# Patient Record
Sex: MALE | Race: WHITE | NOT HISPANIC OR LATINO | Employment: FULL TIME | URBAN - METROPOLITAN AREA
[De-identification: names, ages, dates, MRNs, and addresses within clinical notes are randomized per-mention and may not be internally consistent; named-entity substitution may affect disease eponyms.]

---
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
## 2019-03-03 NOTE — PROGRESS NOTES
FAMILY MEDICINE    Patient Active Problem List   Diagnosis    Attention deficit hyperactivity disorder (ADHD)       CC:   Chief Complaint   Patient presents with    Establish Care       HPI: Hiram Martinez is a 18 y.o. male  - with ADHD here to transfer care from NP Thu Gil. Pt is also here for follow-up of his ADHD and Vyvanse refill.     1. ADHD predominantly inattentive    Age diagnosed: middle school 5th grade  Diagnosed by: Dr. Gillespie (peditrician)   Initial evaluation present in chart: no but continued care by initially provider Dr. Gillespie in chart    Past medications: no other medication beside Vyvanse  Reasons for changing past medications: NA    Current medications: Vyvanse 30 mg daily  Concerns with medication: denies  AM medication: Vyvanse 30 mg daily at 6:30 AM  Lunch medication: NA  Afternoon medication: NA   Medication holiday: weekends and school holidays    Daily Activity: [employer] and season just ended 3/2019        HEALTH MAINTENANCE:   Health Maintenance   Topic Date Due    Lipid Panel  2000    TETANUS VACCINE  12/17/2017    Influenza Vaccine  08/01/2018    HPV Vaccines  Completed       ROS: Review of Systems   Constitutional: Negative for activity change, appetite change, fatigue and unexpected weight change.   Eyes: Negative for visual disturbance.   Respiratory: Negative for chest tightness and shortness of breath.    Cardiovascular: Negative for chest pain and palpitations.   Gastrointestinal: Negative for abdominal pain, constipation, diarrhea, nausea and vomiting.   Endocrine: Negative for cold intolerance.   Genitourinary: Negative for difficulty urinating and urgency.   Musculoskeletal: Negative for myalgias.   Skin: Negative for rash.   Neurological: Negative for dizziness, light-headedness and headaches.   Psychiatric/Behavioral: Negative for agitation, dysphoric mood and sleep disturbance. The patient is not nervous/anxious.        ALLERGIES:  "  Review of patient's allergies indicates:  No Known Allergies    MEDS:     Current Outpatient Medications:     lisdexamfetamine (VYVANSE) 30 MG capsule, Take 1 capsule (30 mg total) by mouth every morning., Disp: 90 capsule, Rfl: 0    Past Medical History:   Diagnosis Date    ADHD (attention deficit hyperactivity disorder)     Closed left fibular fracture 02/2018       History reviewed. No pertinent surgical history.    Family History   Problem Relation Age of Onset    Hypertension Father     Diabetes Father     Heart disease Father     No Known Problems Mother        Social History     Social History Narrative    Lives with both parents, Ирина and 1 brother, Mansoor. Goes to [employer] 12th grade. 1 dog, skipper. Wrestler. Denies alcohol, smoking or illicit drugs       OBJECTIVE:   Vitals:    03/04/19 1006   BP: 120/68   BP Location: Left arm   Patient Position: Sitting   BP Method: Medium (Automatic)   Pulse: (!) 53   Temp: 98.2 °F (36.8 °C)   TempSrc: Oral   SpO2: 100%   Weight: 66.9 kg (147 lb 8 oz)   Height: 5' 7" (1.702 m)     Body mass index is 23.1 kg/m².    Physical Exam   Constitutional: No distress.   Cardiovascular: Normal rate, regular rhythm, normal heart sounds and intact distal pulses.   Pulmonary/Chest: Effort normal and breath sounds normal.   Neurological: He is alert.   Psychiatric: He has a normal mood and affect. His speech is normal and behavior is normal.         Depression Patient Health Questionnaire 3/4/2019   In the last two weeks how often have you had little interest or pleasure in doing things 0   In the last two weeks how often have you felt down, depressed or hopeless 0   PHQ-2 Total Score 0     ASSESSMENT:  Problem List Items Addressed This Visit        Psychiatric    Attention deficit hyperactivity disorder (ADHD)    Relevant Medications    lisdexamfetamine (VYVANSE) 30 MG capsule          PLAN:   Orders Placed This Encounter    Tdap Vaccine    Influenza - " Quadrivalent (3 years & older) (PF)    lisdexamfetamine (VYVANSE) 30 MG capsule     Follow-up in 3 months    Dr. Olga Hui D.O.   Family Medicine

## 2019-03-04 ENCOUNTER — OFFICE VISIT (OUTPATIENT)
Dept: FAMILY MEDICINE | Facility: CLINIC | Age: 19
End: 2019-03-04
Payer: COMMERCIAL

## 2019-03-04 VITALS
WEIGHT: 147.5 LBS | BODY MASS INDEX: 23.15 KG/M2 | DIASTOLIC BLOOD PRESSURE: 68 MMHG | TEMPERATURE: 98 F | HEART RATE: 53 BPM | OXYGEN SATURATION: 100 % | SYSTOLIC BLOOD PRESSURE: 120 MMHG | HEIGHT: 67 IN

## 2019-03-04 DIAGNOSIS — F90.9 ATTENTION DEFICIT HYPERACTIVITY DISORDER (ADHD), UNSPECIFIED ADHD TYPE: ICD-10-CM

## 2019-03-04 PROCEDURE — 3008F PR BODY MASS INDEX (BMI) DOCUMENTED: ICD-10-PCS | Mod: CPTII,S$GLB,, | Performed by: FAMILY MEDICINE

## 2019-03-04 PROCEDURE — 90715 TDAP VACCINE GREATER THAN OR EQUAL TO 7YO IM: ICD-10-PCS | Mod: S$GLB,,, | Performed by: FAMILY MEDICINE

## 2019-03-04 PROCEDURE — 99213 PR OFFICE/OUTPT VISIT, EST, LEVL III, 20-29 MIN: ICD-10-PCS | Mod: 25,S$GLB,, | Performed by: FAMILY MEDICINE

## 2019-03-04 PROCEDURE — 90472 IMMUNIZATION ADMIN EACH ADD: CPT | Mod: S$GLB,,, | Performed by: FAMILY MEDICINE

## 2019-03-04 PROCEDURE — 90715 TDAP VACCINE 7 YRS/> IM: CPT | Mod: S$GLB,,, | Performed by: FAMILY MEDICINE

## 2019-03-04 PROCEDURE — 99999 PR PBB SHADOW E&M-EST. PATIENT-LVL III: ICD-10-PCS | Mod: PBBFAC,,, | Performed by: FAMILY MEDICINE

## 2019-03-04 PROCEDURE — 99999 PR PBB SHADOW E&M-EST. PATIENT-LVL III: CPT | Mod: PBBFAC,,, | Performed by: FAMILY MEDICINE

## 2019-03-04 PROCEDURE — 90471 FLU VACCINE (QUAD) GREATER THAN OR EQUAL TO 3YO PRESERVATIVE FREE IM: ICD-10-PCS | Mod: S$GLB,,, | Performed by: FAMILY MEDICINE

## 2019-03-04 PROCEDURE — 90471 IMMUNIZATION ADMIN: CPT | Mod: S$GLB,,, | Performed by: FAMILY MEDICINE

## 2019-03-04 PROCEDURE — 99213 OFFICE O/P EST LOW 20 MIN: CPT | Mod: 25,S$GLB,, | Performed by: FAMILY MEDICINE

## 2019-03-04 PROCEDURE — 90686 IIV4 VACC NO PRSV 0.5 ML IM: CPT | Mod: S$GLB,,, | Performed by: FAMILY MEDICINE

## 2019-03-04 PROCEDURE — 3008F BODY MASS INDEX DOCD: CPT | Mod: CPTII,S$GLB,, | Performed by: FAMILY MEDICINE

## 2019-03-04 PROCEDURE — 90472 TDAP VACCINE GREATER THAN OR EQUAL TO 7YO IM: ICD-10-PCS | Mod: S$GLB,,, | Performed by: FAMILY MEDICINE

## 2019-03-04 PROCEDURE — 90686 FLU VACCINE (QUAD) GREATER THAN OR EQUAL TO 3YO PRESERVATIVE FREE IM: ICD-10-PCS | Mod: S$GLB,,, | Performed by: FAMILY MEDICINE

## 2019-03-04 RX ORDER — LISDEXAMFETAMINE DIMESYLATE 30 MG/1
30 CAPSULE ORAL EVERY MORNING
Qty: 90 CAPSULE | Refills: 0 | Status: SHIPPED | OUTPATIENT
Start: 2019-03-04 | End: 2020-02-21 | Stop reason: SDUPTHER

## 2019-03-04 NOTE — LETTER
March 4, 2019      [place]  [address]  [address]  [facility] [address]           [address] [place]   [facility] [address]  Phone: [phone]  Fax: [phone]          Patient: Hiram Martinez   MR Number: [id number]   YOB: 2000   Date of Visit: 3/4/2019       Dear Thu Gil:    Thank you for referring Hiram Martinez to me for evaluation. Attached you will find relevant portions of my assessment and plan of care.    If you have questions, please do not hesitate to call me. I look forward to following Hiram Martinez along with you.    Sincerely,    Olga Hui,     Enclosure  CC:  No Recipients    If you would like to receive this communication electronically, please contact [email or web address] or [phone] to request more information on [employer] Link access.    For providers and/or their staff who would like to refer a patient to [facility], please contact us through our one-stop-shop provider referral line, [facility], at [phone].    If you feel you have received this communication in error or would no longer like to receive these types of communications, please e-mail [email or web address]

## 2019-08-08 ENCOUNTER — OFFICE VISIT (OUTPATIENT)
Dept: URGENT CARE | Facility: CLINIC | Age: 19
End: 2019-08-08
Payer: COMMERCIAL

## 2019-08-08 VITALS
HEIGHT: 67 IN | SYSTOLIC BLOOD PRESSURE: 157 MMHG | HEART RATE: 63 BPM | TEMPERATURE: 98 F | BODY MASS INDEX: 25.11 KG/M2 | WEIGHT: 160 LBS | RESPIRATION RATE: 18 BRPM | OXYGEN SATURATION: 100 % | DIASTOLIC BLOOD PRESSURE: 70 MMHG

## 2019-08-08 DIAGNOSIS — R03.0 ELEVATED BLOOD PRESSURE READING: ICD-10-CM

## 2019-08-08 DIAGNOSIS — L25.9 CONTACT DERMATITIS, UNSPECIFIED CONTACT DERMATITIS TYPE, UNSPECIFIED TRIGGER: Primary | ICD-10-CM

## 2019-08-08 PROCEDURE — 99214 PR OFFICE/OUTPT VISIT, EST, LEVL IV, 30-39 MIN: ICD-10-PCS | Mod: S$GLB,,, | Performed by: NURSE PRACTITIONER

## 2019-08-08 PROCEDURE — 99214 OFFICE O/P EST MOD 30 MIN: CPT | Mod: S$GLB,,, | Performed by: NURSE PRACTITIONER

## 2019-08-08 PROCEDURE — 3008F PR BODY MASS INDEX (BMI) DOCUMENTED: ICD-10-PCS | Mod: CPTII,S$GLB,, | Performed by: NURSE PRACTITIONER

## 2019-08-08 PROCEDURE — 3008F BODY MASS INDEX DOCD: CPT | Mod: CPTII,S$GLB,, | Performed by: NURSE PRACTITIONER

## 2019-08-08 RX ORDER — PREDNISONE 20 MG/1
20 TABLET ORAL 2 TIMES DAILY
Qty: 10 TABLET | Refills: 0 | Status: SHIPPED | OUTPATIENT
Start: 2019-08-08 | End: 2019-08-13

## 2019-08-08 RX ORDER — CETIRIZINE HYDROCHLORIDE 10 MG/1
10 TABLET ORAL NIGHTLY
Qty: 30 TABLET | Refills: 0 | Status: SHIPPED | OUTPATIENT
Start: 2019-08-08 | End: 2020-02-21

## 2019-08-08 NOTE — PROGRESS NOTES
"Subjective:       Patient ID: Hiram Martinez is a 18 y.o. male.    Vitals:  height is 5' 7" (1.702 m) and weight is 72.6 kg (160 lb). His oral temperature is 98.4 °F (36.9 °C). His blood pressure is 157/70 (abnormal) and his pulse is 63. His respiration is 18 and oxygen saturation is 100%.     Chief Complaint: Rash (right thigh)    Patient stated that he has had rash for 1 week since going camping.    Rash   This is a new problem. Episode onset: 1 week. The problem is unchanged. The affected locations include the left upper leg. It is unknown if there was an exposure to a precipitant. Pertinent negatives include no cough, fever or sore throat. Past treatments include anti-itch cream. The treatment provided no relief.       Constitution: Negative for chills and fever.   HENT: Negative for facial swelling and sore throat.    Neck: Negative for painful lymph nodes.   Eyes: Negative for eye itching and eyelid swelling.   Respiratory: Negative for cough.    Musculoskeletal: Negative for joint pain and joint swelling.   Skin: Positive for rash and erythema. Negative for color change, pale, wound, abrasion, laceration, lesion, skin thickening/induration, puncture wound, abscess, avulsion and hives.   Allergic/Immunologic: Negative for environmental allergies, immunocompromised state and hives.   Hematologic/Lymphatic: Negative for swollen lymph nodes.       Objective:      Physical Exam   Constitutional: He is oriented to person, place, and time. Vital signs are normal. He appears well-developed and well-nourished. He is active and cooperative.  Non-toxic appearance. He does not have a sickly appearance. He does not appear ill. No distress.   HENT:   Head: Normocephalic and atraumatic.   Nose: Nose normal.   Mouth/Throat: Uvula is midline, oropharynx is clear and moist and mucous membranes are normal.   Eyes: Conjunctivae and lids are normal.   Neck: Trachea normal, normal range of motion, full passive range of motion " without pain and phonation normal. Neck supple.   Cardiovascular: Normal rate, regular rhythm, normal heart sounds, intact distal pulses and normal pulses.   Pulses:       Radial pulses are 2+ on the right side, and 2+ on the left side.   Pulmonary/Chest: Effort normal and breath sounds normal.   Abdominal: Soft. Normal appearance and bowel sounds are normal. He exhibits no abdominal bruit, no pulsatile midline mass and no mass.   Musculoskeletal: He exhibits no edema or deformity.   Neurological: He is alert and oriented to person, place, and time. He has normal strength and normal reflexes. No sensory deficit.   Skin: Skin is warm, dry and intact. Capillary refill takes less than 2 seconds. Rash noted. Rash is papular. He is not diaphoretic. There is erythema.        Erythematous, papular streak-like papules noted.    Psychiatric: He has a normal mood and affect. His speech is normal and behavior is normal. Judgment and thought content normal. Cognition and memory are normal.   Nursing note and vitals reviewed.      Assessment:       1. Contact dermatitis, unspecified contact dermatitis type, unspecified trigger    2. Elevated blood pressure reading        Plan:         Presenting to the Urgent Care for a rash. Vitals reassuring. Patient is non-toxic appearing and in no acute distress. No respiratory component or evidence of oropharyngeal swelling/edema to suggest anaphylaxis or angioedema. No headache, neck rigidity, or fever to suggest meningitis. No recent medication use or infections to suggest drug eruption or SJS. No systemic symptoms to suggest viral xanthem. Patient does report exposure to plants to suggest an etiology of the rash.  At this time, I do not believe it is of emergent etiology.       Contact dermatitis, unspecified contact dermatitis type, unspecified trigger  -     cetirizine (ZYRTEC) 10 MG tablet; Take 1 tablet (10 mg total) by mouth every evening.  Dispense: 30 tablet; Refill: 0  -      ranitidine (ZANTAC) 150 MG tablet; Take 1 tablet (150 mg total) by mouth 2 (two) times daily. for 5 days  Dispense: 10 tablet; Refill: 0  -     predniSONE (DELTASONE) 20 MG tablet; Take 1 tablet (20 mg total) by mouth 2 (two) times daily. for 5 days  Dispense: 10 tablet; Refill: 0    Elevated blood pressure reading      Patient Instructions     Follow-up primary care provider for further evaluation and recheck blood pressure.    If you were prescribed a narcotic or controlled medication, do not drive or operate heavy equipment or machinery while taking these medications.  You must understand that you've received an Urgent Care treatment only and that you may be released before all your medical problems are known or treated. You, the patient, will arrange for follow up care as instructed.  Follow up with your PCP or specialty clinic as directed within 2-5 days if not improved or as needed.  You can call [phone] to schedule an appointment with the appropriate provider.  If your condition worsens we recommend that you receive another evaluation at the emergency room immediately or contact your primary medical clinics after hours call service to discuss your concerns.  Please return here or go to the Emergency Department for any concerns or worsening of condition.      Contact Dermatitis  Contact dermatitis is a skin rash caused by something that touches the skin and makes it irritated and inflamed. Your skin may be red, swollen, dry, and may be cracked. Blisters may form and ooze. The rash will itch.  Contact dermatitis can form on the face and neck, backs of hands, forearms, genitals, and lower legs.  People can get contact dermatitis from lots of sources. These include:  · Plants such as poison ivy, oak, or sumac  · Chemicals in hair dyes and rinses, soaps, solvents, waxes, fingernail polish, and deodorants   · Jewelry or watchbands made of nickel  Contact dermatitis is not passed from person to person.  Talk  "with your healthcare provider about what may have caused the rash. A type of allergy testing called "patch testing" may be used to discover what you are allergic to. You will need to avoid the source of your rash in the future to prevent it from coming back.  Treatment is done to relieve itching and prevent the rash from coming back. The rash should go away in a few days to a few weeks.  Home care  Your healthcare provider may prescribe medicine to relieve swelling and itching. Follow all instructions when using these medicines.  General care:  · Avoid anything that heats up your skin, such as hot showers or baths, or direct sunlight. This can make itching worse.  · Apply cold compresses to soothe your sores to help relieve your symptoms. Do this for 30 minutes 3 to 4 times a day. You can make a cold compress by soaking a cloth in cold water. Squeeze out excess water. You can add colloidal oatmeal to the water to help reduce itching. For severe itching in a small area, apply an ice pack wrapped in a thin towel. Do this for 20 minutes 3 to 4 times a day.  · You can also try wet dressings. One way to do this is to wear a wet piece of clothing under a dry one. Wear a damp shirt under a dry shirt if your upper body is affected. This can relieve itching and prevent you from scratching the affected area.  · You can also help relieve large areas of itching by taking a lukewarm bath with colloidal oatmeal added to the water.  · Use hydrocortisone cream for redness and irritation, unless another medicine was prescribed. You can also use benzocaine anesthetic cream or spray. Calamine lotion can also relieve mild symptoms.  · Use oral diphenhydramine to help reduce itching. You can buy this antihistamine at drug and grocery stores. It can make you sleepy, so use lower doses during the daytime. Or you can use loratadine. This is an antihistamine that will not make you sleepy. Do not use diphenhydramine if you have glaucoma or " have trouble urinating due to an enlarged prostate.  · If a plant causes your rash, make sure to wash your skin and the clothes you were wearing when you came into contact with the plant. This is to wash away the plant oils that gave you the rash and prevent more or worse symptoms.  · Stay away from the substance or object that causes your symptoms. If you cant avoid it, wear gloves or some other type of protection.  Follow-up care  Follow up with your healthcare provider, or as advised.  When to seek medical advice  Call your healthcare provider right away if any of these occur:  · Spreading of the rash to other parts of your body  · Severe swelling of your face, eyelids, mouth, throat or tongue  · Trouble urinating due to swelling in the genital area  · Fever of 100.4°F (38°C) or higher  · Redness or swelling that gets worse  · Pain that gets worse  · Foul-smelling fluid leaking from the skin  · Yellow-brown crusts on the open blisters  Date Last Reviewed: 9/1/2016  © 6395-7408 [employer] [address] [place] [address]. All rights reserved. This information is not intended as a substitute for professional medical care. Always follow your healthcare professional's instructions.        What is High Blood Pressure?  High blood pressure (also called hypertension) is known as the silent killer. This is because most of the time it doesnt cause symptoms. In fact, many people dont know they have it until other problems develop. In most cases, high blood pressure cant be cured. Its a disease that often requires lifelong treatment. The good news is that it can be managed.  Understanding blood pressure  The circulatory system is made up of the heart and blood vessels that carry blood through the body. Your heart is the pump for this system. With each heartbeat (contraction), the heart sends blood out through large blood vessels called arteries. Blood pressure is a measure of how hard the  "moving blood pushes against the walls of the arteries.  High blood pressure can harm your health  In a healthy blood vessel, the blood moves smoothly through the vessel and puts normal pressure on the vessel walls.       High blood pressure occurs when blood pushes too hard against artery walls. This causes damage to the artery walls and then the formation of scar tissue as it heals. This makes the arteries stiff and weak. Plaque sticks to the scarred tissue narrowing and hardening the arteries. High blood pressure also causes your heart to work harder to get blood out to the body. High blood pressure raises your risk of heart attack, also known as acute myocardial infarction, or AMI, and stroke. It can also lead to kidney disease, and blindness.  Measuring blood pressure  An example of a blood pressure measurement is 120/70 (120 over 70). The top number is the pressure of blood against the artery walls during a heartbeat (systolic). The bottom number is the pressure of blood against artery walls between heartbeats (diastolic). Talk with your healthcare provider to find out what your blood pressure goals should be.   Controlling blood pressure  If your blood pressure is too high, work with your doctor on a plan for lowering it. Below are steps you can take that will help lower your blood pressure.  · Choose heart-healthy foods. Eating healthier meals helps you control your blood pressure. Ask your doctor about the DASH eating plan. This plan helps reduce blood pressure by limiting the amount of sodium (salt) you have in your diet. DASH also encourages eating plenty of fruits and vegetables, low-fat or non-fat dairy, whole-grains, and foods high in fiber, and low in fat.  · Reduce sodium. Reducing sodium in your diet reduces fluid retention. Fluid retention caused by too much salt increases blood volume and blood pressure. The American Heart Associations (AHA) "ideal" sodium intake recommendation is 1,500 milligrams " per day.  However, since American's eat so much salt, the AHA says a positive change can occur by cutting back to even 2,400 milligrams of sodium a day.  · Maintain a healthy weight. Being overweight makes you more likely to have high blood pressure. Losing excess weight helps lower blood pressure.  · Exercise regularly. Daily exercise helps your heart and blood vessels work better and stay healthier. It can help lower your blood pressure.  · Stop smoking. Smoking increases blood pressure and damages blood vessels.  · Limit alcohol. Drinking too much alcohol can raise blood pressure. Men should have no more than 2 drinks a day. Women should have no more than 1. (A drink is equal to 1 beer, or a small glass of wine, or a shot of liquor.)  · Control stress. Stress makes your heart work harder and beat faster. Controlling stress helps you control your blood pressure.  Facts about high blood pressure  · Feeling OK does not mean that blood pressure is under control. Likewise, feeling bad doesnt mean its out of control. The only way to know for sure is to check your pressure regularly.  · Medicine is only one part of controlling high blood pressure. You also need to manage your weight, get regular exercise, and adjust your eating habits.  · High blood pressure is usually a lifelong problem. But it can be controlled with healthy lifestyle changes and medicine.  · Hypertension is not the same as stress. Although stress may be a factor in high blood pressure, its only one part of the story.  · Blood pressure medicines need to be taken every day. Stopping suddenly may cause a dangerous increase in pressure.   Date Last Reviewed: 4/27/2016  © 3190-8920 [employer]. [address] [place] [address], [place] [address]. All rights reserved. This information is not intended as a substitute for professional medical care. Always follow your healthcare professional's instructions.        Your High Blood Pressure Risk  Factors  Risk factors are things that make you more likely to have a disease or condition. Do you know your risk factors for high blood pressure? You cant do anything about some risk factors. But other risk factors are things that can be changed. Know what high blood pressure risk factors you have. Then find out what changes you can make to help control your risk for high blood pressure. Start with the change that you think will be easiest for you.  Risk factors you cant control  Though you cant change any of the things listed below, check off the ones that apply to you. The more boxes you check, the greater your risk for high blood pressure.  Family history  ? One or both of your parents or grandparents has had high blood pressure or heart disease.  Gender and age  ? Youre a man over age 55 or a postmenopausal woman.  Risk factors you can control  There are plenty of risk factors for high blood pressure that you can control. Learn what these risk factors are and then find out how to reduce your risk. Check the ones that apply to you.  ? What you eat  Do you eat a lot of salty, fatty, fried, or greasy foods? Do you go to restaurants or eat out frequently?  ? Alcohol consumption  Do you drink more than 1 drink a day if you are a female or more than 2 drinks a day if you male?   ? If you smoke or someone close to you smokes  Do you smoke cigarettes or cigars, chew tobacco, or dip snuff? Are you exposed to second-hand smoke on a regular basis?  ? How active you are   Are you inactive most of the time at work and at home? Do you go weeks without exercising?  ? Your weight   Has your doctor said that you are 15 or more pounds overweight?  ? Your stress level    Do you often feel anxious, nervous, and stressed? Do you feel that you don't have a supportive environment?  Date Last Reviewed: 4/27/2016  © 5307-4449 [employer]. [address] [place] [address], [place] [address]. All rights reserved. This information  is not intended as a substitute for professional medical care. Always follow your healthcare professional's instructions.

## 2019-08-08 NOTE — PATIENT INSTRUCTIONS
"Follow-up primary care provider for further evaluation and recheck blood pressure.    If you were prescribed a narcotic or controlled medication, do not drive or operate heavy equipment or machinery while taking these medications.  You must understand that you've received an Urgent Care treatment only and that you may be released before all your medical problems are known or treated. You, the patient, will arrange for follow up care as instructed.  Follow up with your PCP or specialty clinic as directed within 2-5 days if not improved or as needed.  You can call [phone] to schedule an appointment with the appropriate provider.  If your condition worsens we recommend that you receive another evaluation at the emergency room immediately or contact your primary medical clinics after hours call service to discuss your concerns.  Please return here or go to the Emergency Department for any concerns or worsening of condition.      Contact Dermatitis  Contact dermatitis is a skin rash caused by something that touches the skin and makes it irritated and inflamed. Your skin may be red, swollen, dry, and may be cracked. Blisters may form and ooze. The rash will itch.  Contact dermatitis can form on the face and neck, backs of hands, forearms, genitals, and lower legs.  People can get contact dermatitis from lots of sources. These include:  · Plants such as poison ivy, oak, or sumac  · Chemicals in hair dyes and rinses, soaps, solvents, waxes, fingernail polish, and deodorants   · Jewelry or watchbands made of nickel  Contact dermatitis is not passed from person to person.  Talk with your healthcare provider about what may have caused the rash. A type of allergy testing called "patch testing" may be used to discover what you are allergic to. You will need to avoid the source of your rash in the future to prevent it from coming back.  Treatment is done to relieve itching and prevent the rash from coming back. The rash " should go away in a few days to a few weeks.  Home care  Your healthcare provider may prescribe medicine to relieve swelling and itching. Follow all instructions when using these medicines.  General care:  · Avoid anything that heats up your skin, such as hot showers or baths, or direct sunlight. This can make itching worse.  · Apply cold compresses to soothe your sores to help relieve your symptoms. Do this for 30 minutes 3 to 4 times a day. You can make a cold compress by soaking a cloth in cold water. Squeeze out excess water. You can add colloidal oatmeal to the water to help reduce itching. For severe itching in a small area, apply an ice pack wrapped in a thin towel. Do this for 20 minutes 3 to 4 times a day.  · You can also try wet dressings. One way to do this is to wear a wet piece of clothing under a dry one. Wear a damp shirt under a dry shirt if your upper body is affected. This can relieve itching and prevent you from scratching the affected area.  · You can also help relieve large areas of itching by taking a lukewarm bath with colloidal oatmeal added to the water.  · Use hydrocortisone cream for redness and irritation, unless another medicine was prescribed. You can also use benzocaine anesthetic cream or spray. Calamine lotion can also relieve mild symptoms.  · Use oral diphenhydramine to help reduce itching. You can buy this antihistamine at drug and grocery stores. It can make you sleepy, so use lower doses during the daytime. Or you can use loratadine. This is an antihistamine that will not make you sleepy. Do not use diphenhydramine if you have glaucoma or have trouble urinating due to an enlarged prostate.  · If a plant causes your rash, make sure to wash your skin and the clothes you were wearing when you came into contact with the plant. This is to wash away the plant oils that gave you the rash and prevent more or worse symptoms.  · Stay away from the substance or object that causes your  symptoms. If you cant avoid it, wear gloves or some other type of protection.  Follow-up care  Follow up with your healthcare provider, or as advised.  When to seek medical advice  Call your healthcare provider right away if any of these occur:  · Spreading of the rash to other parts of your body  · Severe swelling of your face, eyelids, mouth, throat or tongue  · Trouble urinating due to swelling in the genital area  · Fever of 100.4°F (38°C) or higher  · Redness or swelling that gets worse  · Pain that gets worse  · Foul-smelling fluid leaking from the skin  · Yellow-brown crusts on the open blisters  Date Last Reviewed: 9/1/2016  © 4927-7351 [employer]. [address] [place] [address], [place] [address]. All rights reserved. This information is not intended as a substitute for professional medical care. Always follow your healthcare professional's instructions.        What is High Blood Pressure?  High blood pressure (also called hypertension) is known as the silent killer. This is because most of the time it doesnt cause symptoms. In fact, many people dont know they have it until other problems develop. In most cases, high blood pressure cant be cured. Its a disease that often requires lifelong treatment. The good news is that it can be managed.  Understanding blood pressure  The circulatory system is made up of the heart and blood vessels that carry blood through the body. Your heart is the pump for this system. With each heartbeat (contraction), the heart sends blood out through large blood vessels called arteries. Blood pressure is a measure of how hard the moving blood pushes against the walls of the arteries.  High blood pressure can harm your health  In a healthy blood vessel, the blood moves smoothly through the vessel and puts normal pressure on the vessel walls.       High blood pressure occurs when blood pushes too hard against artery walls. This causes damage to the artery walls and then  "the formation of scar tissue as it heals. This makes the arteries stiff and weak. Plaque sticks to the scarred tissue narrowing and hardening the arteries. High blood pressure also causes your heart to work harder to get blood out to the body. High blood pressure raises your risk of heart attack, also known as acute myocardial infarction, or AMI, and stroke. It can also lead to kidney disease, and blindness.  Measuring blood pressure  An example of a blood pressure measurement is 120/70 (120 over 70). The top number is the pressure of blood against the artery walls during a heartbeat (systolic). The bottom number is the pressure of blood against artery walls between heartbeats (diastolic). Talk with your healthcare provider to find out what your blood pressure goals should be.   Controlling blood pressure  If your blood pressure is too high, work with your doctor on a plan for lowering it. Below are steps you can take that will help lower your blood pressure.  · Choose heart-healthy foods. Eating healthier meals helps you control your blood pressure. Ask your doctor about the DASH eating plan. This plan helps reduce blood pressure by limiting the amount of sodium (salt) you have in your diet. DASH also encourages eating plenty of fruits and vegetables, low-fat or non-fat dairy, whole-grains, and foods high in fiber, and low in fat.  · Reduce sodium. Reducing sodium in your diet reduces fluid retention. Fluid retention caused by too much salt increases blood volume and blood pressure. The American Heart Associations (AHA) "ideal" sodium intake recommendation is 1,500 milligrams per day.  However, since American's eat so much salt, the AHA says a positive change can occur by cutting back to even 2,400 milligrams of sodium a day.  · Maintain a healthy weight. Being overweight makes you more likely to have high blood pressure. Losing excess weight helps lower blood pressure.  · Exercise regularly. Daily exercise helps " your heart and blood vessels work better and stay healthier. It can help lower your blood pressure.  · Stop smoking. Smoking increases blood pressure and damages blood vessels.  · Limit alcohol. Drinking too much alcohol can raise blood pressure. Men should have no more than 2 drinks a day. Women should have no more than 1. (A drink is equal to 1 beer, or a small glass of wine, or a shot of liquor.)  · Control stress. Stress makes your heart work harder and beat faster. Controlling stress helps you control your blood pressure.  Facts about high blood pressure  · Feeling OK does not mean that blood pressure is under control. Likewise, feeling bad doesnt mean its out of control. The only way to know for sure is to check your pressure regularly.  · Medicine is only one part of controlling high blood pressure. You also need to manage your weight, get regular exercise, and adjust your eating habits.  · High blood pressure is usually a lifelong problem. But it can be controlled with healthy lifestyle changes and medicine.  · Hypertension is not the same as stress. Although stress may be a factor in high blood pressure, its only one part of the story.  · Blood pressure medicines need to be taken every day. Stopping suddenly may cause a dangerous increase in pressure.   Date Last Reviewed: 4/27/2016  © 7827-1444 [employer]. [address] [place] [address], [place] [address]. All rights reserved. This information is not intended as a substitute for professional medical care. Always follow your healthcare professional's instructions.        Your High Blood Pressure Risk Factors  Risk factors are things that make you more likely to have a disease or condition. Do you know your risk factors for high blood pressure? You cant do anything about some risk factors. But other risk factors are things that can be changed. Know what high blood pressure risk factors you have. Then find out what changes you can make to help  control your risk for high blood pressure. Start with the change that you think will be easiest for you.  Risk factors you cant control  Though you cant change any of the things listed below, check off the ones that apply to you. The more boxes you check, the greater your risk for high blood pressure.  Family history  ? One or both of your parents or grandparents has had high blood pressure or heart disease.  Gender and age  ? Youre a man over age 55 or a postmenopausal woman.  Risk factors you can control  There are plenty of risk factors for high blood pressure that you can control. Learn what these risk factors are and then find out how to reduce your risk. Check the ones that apply to you.  ? What you eat  Do you eat a lot of salty, fatty, fried, or greasy foods? Do you go to restaurants or eat out frequently?  ? Alcohol consumption  Do you drink more than 1 drink a day if you are a female or more than 2 drinks a day if you male?   ? If you smoke or someone close to you smokes  Do you smoke cigarettes or cigars, chew tobacco, or dip snuff? Are you exposed to second-hand smoke on a regular basis?  ? How active you are   Are you inactive most of the time at work and at home? Do you go weeks without exercising?  ? Your weight   Has your doctor said that you are 15 or more pounds overweight?  ? Your stress level    Do you often feel anxious, nervous, and stressed? Do you feel that you don't have a supportive environment?  Date Last Reviewed: 4/27/2016  © 1629-6350 [employer]. [address] [place] [address], [place] [address]. All rights reserved. This information is not intended as a substitute for professional medical care. Always follow your healthcare professional's instructions.

## 2019-08-11 ENCOUNTER — TELEPHONE (OUTPATIENT)
Dept: URGENT CARE | Facility: CLINIC | Age: 19
End: 2019-08-11

## 2019-09-20 ENCOUNTER — TELEPHONE (OUTPATIENT)
Dept: FAMILY MEDICINE | Facility: CLINIC | Age: 19
End: 2019-09-20

## 2019-09-20 NOTE — TELEPHONE ENCOUNTER
----- Message from Mariela Young sent at 9/20/2019  9:34 AM CDT -----  Came this morning and dropped off form to be filled for his Shot Record   States would like call when this is done hopefully today so can have for work on Monday      Thank You

## 2020-01-10 ENCOUNTER — CLINICAL SUPPORT (OUTPATIENT)
Dept: URGENT CARE | Facility: CLINIC | Age: 20
End: 2020-01-10

## 2020-01-10 VITALS
OXYGEN SATURATION: 99 % | DIASTOLIC BLOOD PRESSURE: 62 MMHG | RESPIRATION RATE: 18 BRPM | BODY MASS INDEX: 28.25 KG/M2 | WEIGHT: 180 LBS | HEIGHT: 67 IN | HEART RATE: 68 BPM | TEMPERATURE: 98 F | SYSTOLIC BLOOD PRESSURE: 127 MMHG

## 2020-01-10 DIAGNOSIS — Z02.4 ENCOUNTER FOR COMMERCIAL DRIVER MEDICAL EXAMINATION (CDME): Primary | ICD-10-CM

## 2020-01-10 PROCEDURE — 99499 UNLISTED E&M SERVICE: CPT | Mod: S$GLB,,, | Performed by: NURSE PRACTITIONER

## 2020-01-10 PROCEDURE — 99499 NO LOS: ICD-10-PCS | Mod: S$GLB,,, | Performed by: NURSE PRACTITIONER

## 2020-01-10 NOTE — PROGRESS NOTES
Subjective:       Patient ID: Hiram Martinez is a 19 y.o. male.    Chief Complaint: Annual Exam    Patient is here for CDL physical.    ROS     Objective:      Physical Exam    Assessment:       1. Encounter for  medical examination (CDME)        Plan:                   Follow up if symptoms worsen or fail to improve.

## 2020-02-07 ENCOUNTER — PATIENT OUTREACH (OUTPATIENT)
Dept: ADMINISTRATIVE | Facility: HOSPITAL | Age: 20
End: 2020-02-07

## 2020-02-07 ENCOUNTER — TELEPHONE (OUTPATIENT)
Dept: FAMILY MEDICINE | Facility: CLINIC | Age: 20
End: 2020-02-07

## 2020-02-07 DIAGNOSIS — Z11.4 SCREENING FOR HIV (HUMAN IMMUNODEFICIENCY VIRUS): ICD-10-CM

## 2020-02-07 DIAGNOSIS — Z13.228 SCREENING FOR METABOLIC DISORDER: Primary | ICD-10-CM

## 2020-02-07 DIAGNOSIS — E78.5 HYPERLIPIDEMIA, UNSPECIFIED HYPERLIPIDEMIA TYPE: Primary | ICD-10-CM

## 2020-02-07 NOTE — TELEPHONE ENCOUNTER
----- Message from Alcira Michelle MA sent at 2/7/2020  2:43 PM CST -----  Regarding: Labs  Good Afternoon,     Mr. Martinez is scheduled to see you for a follow up on 2/21/20. I scheduled to have his Lipid Panel drawn on 2/14/20 as it is overdue. Please review & order additional labs if needed. Please let me know if I need to link additional orders.     Thank you,    Alcira Michelle

## 2020-02-19 PROBLEM — Z00.01 ENCOUNTER FOR GENERAL ADULT MEDICAL EXAMINATION WITH ABNORMAL FINDINGS: Status: ACTIVE | Noted: 2020-02-19

## 2020-02-19 NOTE — PROGRESS NOTES
FAMILY MEDICINE    Patient Active Problem List   Diagnosis    Attention deficit hyperactivity disorder (ADHD), predominantly inattentive type       CC:   Chief Complaint   Patient presents with    Medication Refill       HPI: Hiram Martinez is a 19 y.o. male  - with ADHD presents for follow-up ADHD and labs.     Off of ADHD medication since 4/2019 and reports that he was not working or in school for some time so he stopped taking the medication and has been doing well. He started a new job about 6 months ago for [employer] as an  and notices that his ADHD symptoms were making it difficult to complete tasks. He has some medication left and restart medication     1. ADHD predominantly inattentive     Age diagnosed: middle school 5th grade  Diagnosed by: Dr. Gillespie (peditrician)   Initial evaluation present in chart: no but continued care by initially provider Dr. Gillespie in chart     Past medications: no other medication beside Vyvanse  Reasons for changing past medications: NA     Current medications: Vyvanse 30 mg daily  Concerns with medication: reports that effectiveness stops about 1 pPM  AM medication: Vyvanse 30 mg daily at 6:30 AM prior to work  Lunch medication: NA  Afternoon medication: NA   Medication holiday: weekends and school holidays     Daily Activity:  for the [employer]  - 7AM-5PM and off Fridays      HEALTH MAINTENANCE:   Health Maintenance   Topic Date Due    TETANUS VACCINE  03/04/2029    Lipid Panel  Completed    HPV Vaccines  Completed       ROS: Review of Systems   Constitutional: Negative.    HENT: Negative.    Eyes: Negative.    Respiratory: Negative.    Cardiovascular: Negative.    Gastrointestinal: Negative.    Endocrine: Negative.    Genitourinary: Negative.    Musculoskeletal: Negative.    Skin: Negative.    Allergic/Immunologic: Negative.    Neurological: Negative.    Hematological: Negative.    Psychiatric/Behavioral: Negative.        ALLERGIES:   Review of patient's  "allergies indicates:  No Known Allergies    MEDS:     Current Outpatient Medications:     lisdexamfetamine (VYVANSE) 40 MG Cap, Take 1 capsule (40 mg total) by mouth every morning., Disp: 30 capsule, Rfl: 0    lisdexamfetamine (VYVANSE) 40 MG Cap, Take 1 capsule (40 mg total) by mouth once daily., Disp: 30 capsule, Rfl: 0    lisdexamfetamine (VYVANSE) 40 MG Cap, Take 1 capsule (40 mg total) by mouth once daily., Disp: 30 capsule, Rfl: 0    Past Medical History:   Diagnosis Date    ADHD (attention deficit hyperactivity disorder)     Closed left fibular fracture 02/2018       No past surgical history on file.    Family History   Problem Relation Age of Onset    Hypertension Father     Diabetes Father     Heart disease Father     No Known Problems Mother        Social History     Tobacco Use    Smoking status: Never Smoker    Smokeless tobacco: Never Used   Substance Use Topics    Alcohol use: No     Frequency: Never    Drug use: No       Social History     Social History Narrative    Lives with both parents, Rachel and Mateo and 1 brother, Mansoor. Graduate [employer] 2019. 1 dog, skipper. Former Wrestler. Denies alcohol, smoking or illicit drugs. Works at  [employer] as an        OBJECTIVE:   Vitals:    02/21/20 0705   BP: (P) 118/64   BP Location: Left arm   Patient Position: Sitting   BP Method: Large (Manual)   Pulse: 63   Temp: 98.2 °F (36.8 °C)   TempSrc: Oral   SpO2: 99%   Weight: 83.4 kg (183 lb 12.8 oz)   Height: 5' 7" (1.702 m)     Body mass index is 28.79 kg/m².    Physical Exam   Constitutional: He is oriented to person, place, and time. No distress.   HENT:   Head: Normocephalic and atraumatic.   Right Ear: Tympanic membrane and ear canal normal.   Left Ear: Tympanic membrane and ear canal normal.   Nose: Nose normal.   Mouth/Throat: Uvula is midline, oropharynx is clear and moist and mucous membranes are normal.   Eyes: Pupils are equal, round, and reactive to light. " Conjunctivae and EOM are normal.   Neck: Trachea normal. Neck supple. Normal carotid pulses and no JVD present. Carotid bruit is not present. No thyromegaly present.   Cardiovascular: Normal rate, regular rhythm, normal heart sounds and intact distal pulses. Exam reveals no gallop and no friction rub.   No murmur heard.  Pulmonary/Chest: Effort normal and breath sounds normal. He has no decreased breath sounds. He has no wheezes. He has no rhonchi. He has no rales.   Abdominal: Soft. Normal appearance and bowel sounds are normal. There is no tenderness.   Neurological: He is alert and oriented to person, place, and time.   Skin: Skin is warm. Capillary refill takes less than 2 seconds. No cyanosis. Nails show no clubbing.         Depression Patient Health Questionnaire 2/21/2020 3/4/2019   Over the last two weeks how often have you been bothered by little interest or pleasure in doing things 0 0   Over the last two weeks how often have you been bothered by feeling down, depressed or hopeless 0 0   PHQ-2 Total Score 0 0       PERTINENT RESULTS:   No visits with results within 1 Week(s) from this visit.   Latest known visit with results is:   Lab Visit on 02/14/2020   Component Date Value Ref Range Status    Cholesterol 02/14/2020 201* 120 - 199 mg/dL Final    Comment: The National Cholesterol Education Program (NCEP) has set the  following guidelines (reference ranges) for Cholesterol:  Optimal.....................<200 mg/dL  Borderline High.............200-239 mg/dL  High........................> or = 240 mg/dL      Triglycerides 02/14/2020 95  30 - 150 mg/dL Final    Comment: The National Cholesterol Education Program (NCEP) has set the  following guidelines (reference values) for triglycerides:  Normal......................<150 mg/dL  Borderline High.............150-199 mg/dL  High........................200-499 mg/dL      HDL 02/14/2020 49  40 - 75 mg/dL Final    Comment: The National Cholesterol Education  Program (NCEP) has set the  following guidelines (reference values) for HDL Cholesterol:  Low...............<40 mg/dL  Optimal...........>60 mg/dL      LDL Cholesterol 02/14/2020 133.0  63.0 - 159.0 mg/dL Final    Comment: The National Cholesterol Education Program (NCEP) has set the  following guidelines (reference values) for LDL Cholesterol:  Optimal.......................<130 mg/dL  Borderline High...............130-159 mg/dL  High..........................160-189 mg/dL  Very High.....................>190 mg/dL      Hdl/Cholesterol Ratio 02/14/2020 24.4  20.0 - 50.0 % Final    Total Cholesterol/HDL Ratio 02/14/2020 4.1  2.0 - 5.0 Final    Non-HDL Cholesterol 02/14/2020 152  mg/dL Final    Comment: Risk category and Non-HDL cholesterol goals:  Coronary heart disease (CHD)or equivalent (10-year risk of CHD >20%):  Non-HDL cholesterol goal     <130 mg/dL  Two or more CHD risk factors and 10-year risk of CHD <= 20%:  Non-HDL cholesterol goal     <160 mg/dL  0 to 1 CHD risk factor:  Non-HDL cholesterol goal     <190 mg/dL      Sodium 02/14/2020 145  136 - 145 mmol/L Final    Potassium 02/14/2020 4.5  3.5 - 5.1 mmol/L Final    Chloride 02/14/2020 104  95 - 110 mmol/L Final    CO2 02/14/2020 27  23 - 29 mmol/L Final    Glucose 02/14/2020 98  70 - 110 mg/dL Final    BUN, Bld 02/14/2020 11  2 - 20 mg/dL Final    Creatinine 02/14/2020 1.08  0.50 - 1.40 mg/dL Final    Calcium 02/14/2020 9.8  8.7 - 10.5 mg/dL Final    Total Protein 02/14/2020 7.9  6.0 - 8.4 g/dL Final    Albumin 02/14/2020 5.1  3.5 - 5.2 g/dL Final    Total Bilirubin 02/14/2020 0.9  0.1 - 1.0 mg/dL Final    Comment: For infants and newborns, interpretation of results should be based  on gestational age, weight and in agreement with clinical  observations.  Premature Infant recommended reference ranges:  Up to 24 hours.............<8.0 mg/dL  Up to 48 hours............<12.0 mg/dL  3-5 days..................<15.0 mg/dL  6-29  days.................<15.0 mg/dL      Alkaline Phosphatase 02/14/2020 78  38 - 126 U/L Final    AST 02/14/2020 28  15 - 46 U/L Final    ALT 02/14/2020 22  10 - 44 U/L Final    Anion Gap 02/14/2020 14  8 - 16 mmol/L Final    eGFR if African American 02/14/2020 >60.0  >60 mL/min/1.73 m^2 Final    eGFR if non African American 02/14/2020 >60.0  >60 mL/min/1.73 m^2 Final    Comment: Calculation used to obtain the estimated glomerular filtration  rate (eGFR) is the CKD-EPI equation.       HIV 1/2 Ag/Ab 02/14/2020 Negative  Negative Final       ASSESSMENT/PLAN:  Problem List Items Addressed This Visit        Psychiatric    Attention deficit hyperactivity disorder (ADHD), predominantly inattentive type - Primary    Current Assessment & Plan     -  reviewed  - recommend trial increase Vyvanse from 30 mg daily to 40 mg daily (3 Rx's given to pt)  - encouraged to patient to notify me of any questions or concerns         Relevant Medications    lisdexamfetamine (VYVANSE) 40 MG Cap    lisdexamfetamine (VYVANSE) 40 MG Cap    lisdexamfetamine (VYVANSE) 40 MG Cap      Other Visit Diagnoses     Need for influenza vaccination        Relevant Orders    Influenza - Quadrivalent (PF) (Completed)          ORDERS:   Orders Placed This Encounter    Influenza - Quadrivalent (PF)    lisdexamfetamine (VYVANSE) 40 MG Cap    lisdexamfetamine (VYVANSE) 40 MG Cap    lisdexamfetamine (VYVANSE) 40 MG Cap     Reviewed labs with pt.  Vaccines recommended: flu vaccine    Follow-up in 3 months or sooner if any concerns.     Dr. Olga Hui D.O.   Family Medicine

## 2020-02-21 ENCOUNTER — OFFICE VISIT (OUTPATIENT)
Dept: FAMILY MEDICINE | Facility: CLINIC | Age: 20
End: 2020-02-21
Payer: COMMERCIAL

## 2020-02-21 VITALS
OXYGEN SATURATION: 99 % | DIASTOLIC BLOOD PRESSURE: 62 MMHG | TEMPERATURE: 98 F | HEIGHT: 67 IN | SYSTOLIC BLOOD PRESSURE: 118 MMHG | WEIGHT: 183.81 LBS | BODY MASS INDEX: 28.85 KG/M2 | HEART RATE: 63 BPM

## 2020-02-21 DIAGNOSIS — F90.0 ATTENTION DEFICIT HYPERACTIVITY DISORDER (ADHD), PREDOMINANTLY INATTENTIVE TYPE: Primary | ICD-10-CM

## 2020-02-21 DIAGNOSIS — Z23 NEED FOR INFLUENZA VACCINATION: ICD-10-CM

## 2020-02-21 PROCEDURE — 99214 PR OFFICE/OUTPT VISIT, EST, LEVL IV, 30-39 MIN: ICD-10-PCS | Mod: 25,S$GLB,, | Performed by: FAMILY MEDICINE

## 2020-02-21 PROCEDURE — 90686 IIV4 VACC NO PRSV 0.5 ML IM: CPT | Mod: S$GLB,,, | Performed by: FAMILY MEDICINE

## 2020-02-21 PROCEDURE — 90471 IMMUNIZATION ADMIN: CPT | Mod: S$GLB,,, | Performed by: FAMILY MEDICINE

## 2020-02-21 PROCEDURE — 99214 OFFICE O/P EST MOD 30 MIN: CPT | Mod: 25,S$GLB,, | Performed by: FAMILY MEDICINE

## 2020-02-21 PROCEDURE — 90686 FLU VACCINE (QUAD) GREATER THAN OR EQUAL TO 3YO PRESERVATIVE FREE IM: ICD-10-PCS | Mod: S$GLB,,, | Performed by: FAMILY MEDICINE

## 2020-02-21 PROCEDURE — 90471 FLU VACCINE (QUAD) GREATER THAN OR EQUAL TO 3YO PRESERVATIVE FREE IM: ICD-10-PCS | Mod: S$GLB,,, | Performed by: FAMILY MEDICINE

## 2020-02-21 PROCEDURE — 3008F PR BODY MASS INDEX (BMI) DOCUMENTED: ICD-10-PCS | Mod: CPTII,S$GLB,, | Performed by: FAMILY MEDICINE

## 2020-02-21 PROCEDURE — 99999 PR PBB SHADOW E&M-EST. PATIENT-LVL III: ICD-10-PCS | Mod: PBBFAC,,, | Performed by: FAMILY MEDICINE

## 2020-02-21 PROCEDURE — 3008F BODY MASS INDEX DOCD: CPT | Mod: CPTII,S$GLB,, | Performed by: FAMILY MEDICINE

## 2020-02-21 PROCEDURE — 99999 PR PBB SHADOW E&M-EST. PATIENT-LVL III: CPT | Mod: PBBFAC,,, | Performed by: FAMILY MEDICINE

## 2020-02-21 RX ORDER — LISDEXAMFETAMINE DIMESYLATE 40 MG/1
40 CAPSULE ORAL DAILY
Qty: 30 CAPSULE | Refills: 0 | Status: SHIPPED | OUTPATIENT
Start: 2020-02-21 | End: 2020-08-13 | Stop reason: SDUPTHER

## 2020-02-21 RX ORDER — LISDEXAMFETAMINE DIMESYLATE 40 MG/1
40 CAPSULE ORAL EVERY MORNING
Qty: 30 CAPSULE | Refills: 0 | Status: SHIPPED | OUTPATIENT
Start: 2020-02-21 | End: 2020-08-13 | Stop reason: SDUPTHER

## 2020-02-21 NOTE — ASSESSMENT & PLAN NOTE
-  reviewed  - recommend trial increase Vyvanse from 30 mg daily to 40 mg daily (3 Rx's given to pt)  - encouraged to patient to notify me of any questions or concerns

## 2020-05-14 ENCOUNTER — TELEPHONE (OUTPATIENT)
Dept: FAMILY MEDICINE | Facility: CLINIC | Age: 20
End: 2020-05-14

## 2020-05-14 NOTE — TELEPHONE ENCOUNTER
----- Message from Cecilia Benoit MA sent at 5/14/2020  8:27 AM CDT -----  Hey whenever you get a chance can you put this pt in the June 3rd visit 12 pm visit. This is the day that the 12:10 pm is a virtual.

## 2020-08-13 NOTE — PROGRESS NOTES
FAMILY MEDICINE    Patient Active Problem List   Diagnosis    Attention deficit hyperactivity disorder (ADHD), predominantly inattentive type       CC:   Chief Complaint   Patient presents with    Follow-up    ADHD       HPI: Hiram Martinez is a 19 y.o. male  - with ADHD presents for follow-up ADHD     Reports doing well since last visit and tolerating medication. Denies any concerns today.      1. ADHD predominantly inattentive     Age diagnosed: middle school 5th grade  Diagnosed by: Dr. Gillespie (peditrician)   Initial evaluation present in chart: no but continued care by initially provider Dr. Gillespie in chart     Past medications: no other medication beside Vyvanse  Reasons for changing past medications: NA     Current medications:   lisdexamfetamine (VYVANSE) 40 MG Cap, Take 1 capsule (40 mg total) by mouth every morning., Disp: 30 capsule, Rfl: 0    : reviewed last filled 4/24/2020    Concerns with medication: none    AM medication: Vyvanse 40 mg daily at 6:30 AM prior to work  Lunch medication: NA  Afternoon medication: NA   Medication holiday: weekends and school holidays     Daily Activity:  for the [place]  - 7AM-5PM and off Fridays      HEALTH MAINTENANCE:   Health Maintenance   Topic Date Due    Hepatitis C Screening  2000    TETANUS VACCINE  03/04/2029    Lipid Panel  Completed    HPV Vaccines  Completed       ROS: Review of Systems   Constitutional: Negative.    HENT: Negative.    Eyes: Negative.    Respiratory: Negative.    Cardiovascular: Negative.    Gastrointestinal: Negative.    Endocrine: Negative.    Genitourinary: Negative.    Musculoskeletal: Negative.    Skin: Negative.    Allergic/Immunologic: Negative.    Neurological: Negative.    Hematological: Negative.    Psychiatric/Behavioral: Negative.        ALLERGIES:   Review of patient's allergies indicates:  No Known Allergies    MEDS:     Current Outpatient Medications:     lisdexamfetamine (VYVANSE) 40 MG Cap, Take 1  "capsule (40 mg total) by mouth every morning., Disp: 30 capsule, Rfl: 0    lisdexamfetamine (VYVANSE) 40 MG Cap, Take 1 capsule (40 mg total) by mouth once daily., Disp: 30 capsule, Rfl: 0    lisdexamfetamine (VYVANSE) 40 MG Cap, Take 1 capsule (40 mg total) by mouth once daily., Disp: 30 capsule, Rfl: 0    Past Medical History:   Diagnosis Date    ADHD (attention deficit hyperactivity disorder)     Closed left fibular fracture 02/2018       History reviewed. No pertinent surgical history.    Family History   Problem Relation Age of Onset    Hypertension Father     Diabetes Father     Heart disease Father     No Known Problems Mother        Social History     Tobacco Use    Smoking status: Never Smoker    Smokeless tobacco: Never Used   Substance Use Topics    Alcohol use: No     Frequency: Never    Drug use: No       Social History     Social History Narrative    Lives with both parents, Rachel and Mateo and 1 brother, Mansoor. Graduate [employer] 2019. 1 dog, skipper. Former Wrestler. Denies alcohol, smoking or illicit drugs. Works at [employer] as an        OBJECTIVE:   Vitals:    08/14/20 0939   BP: 120/76   BP Location: Left arm   Patient Position: Sitting   BP Method: Large (Manual)   Pulse: 60   Resp: 18   Temp: 97.2 °F (36.2 °C)   TempSrc: Oral   SpO2: 99%   Weight: 75 kg (165 lb 4.8 oz)   Height: 5' 7" (1.702 m)     Body mass index is 25.89 kg/m².    Physical Exam  Constitutional:       General: He is not in acute distress.  Cardiovascular:      Rate and Rhythm: Normal rate and regular rhythm.      Heart sounds: Normal heart sounds. No murmur. No friction rub. No gallop.    Pulmonary:      Effort: Pulmonary effort is normal.      Breath sounds: Normal breath sounds.   Skin:     General: Skin is warm.   Neurological:      Mental Status: He is alert.   Psychiatric:      Comments: Mood: "good"  Affect: full range           Depression Patient Health Questionnaire 8/14/2020 2/21/2020 " 3/4/2019   Over the last two weeks how often have you been bothered by little interest or pleasure in doing things 0 0 0   Over the last two weeks how often have you been bothered by feeling down, depressed or hopeless 0 0 0   PHQ-2 Total Score 0 0 0       PERTINENT RESULTS:   No visits with results within 1 Week(s) from this visit.   Latest known visit with results is:   Lab Visit on 02/14/2020   Component Date Value Ref Range Status    Cholesterol 02/14/2020 201* 120 - 199 mg/dL Final    Comment: The National Cholesterol Education Program (NCEP) has set the  following guidelines (reference ranges) for Cholesterol:  Optimal.....................<200 mg/dL  Borderline High.............200-239 mg/dL  High........................> or = 240 mg/dL      Triglycerides 02/14/2020 95  30 - 150 mg/dL Final    Comment: The National Cholesterol Education Program (NCEP) has set the  following guidelines (reference values) for triglycerides:  Normal......................<150 mg/dL  Borderline High.............150-199 mg/dL  High........................200-499 mg/dL      HDL 02/14/2020 49  40 - 75 mg/dL Final    Comment: The National Cholesterol Education Program (NCEP) has set the  following guidelines (reference values) for HDL Cholesterol:  Low...............<40 mg/dL  Optimal...........>60 mg/dL      LDL Cholesterol 02/14/2020 133.0  63.0 - 159.0 mg/dL Final    Comment: The National Cholesterol Education Program (NCEP) has set the  following guidelines (reference values) for LDL Cholesterol:  Optimal.......................<130 mg/dL  Borderline High...............130-159 mg/dL  High..........................160-189 mg/dL  Very High.....................>190 mg/dL      Hdl/Cholesterol Ratio 02/14/2020 24.4  20.0 - 50.0 % Final    Total Cholesterol/HDL Ratio 02/14/2020 4.1  2.0 - 5.0 Final    Non-HDL Cholesterol 02/14/2020 152  mg/dL Final    Comment: Risk category and Non-HDL cholesterol goals:  Coronary heart disease  (CHD)or equivalent (10-year risk of CHD >20%):  Non-HDL cholesterol goal     <130 mg/dL  Two or more CHD risk factors and 10-year risk of CHD <= 20%:  Non-HDL cholesterol goal     <160 mg/dL  0 to 1 CHD risk factor:  Non-HDL cholesterol goal     <190 mg/dL      Sodium 02/14/2020 145  136 - 145 mmol/L Final    Potassium 02/14/2020 4.5  3.5 - 5.1 mmol/L Final    Chloride 02/14/2020 104  95 - 110 mmol/L Final    CO2 02/14/2020 27  23 - 29 mmol/L Final    Glucose 02/14/2020 98  70 - 110 mg/dL Final    BUN, Bld 02/14/2020 11  2 - 20 mg/dL Final    Creatinine 02/14/2020 1.08  0.50 - 1.40 mg/dL Final    Calcium 02/14/2020 9.8  8.7 - 10.5 mg/dL Final    Total Protein 02/14/2020 7.9  6.0 - 8.4 g/dL Final    Albumin 02/14/2020 5.1  3.5 - 5.2 g/dL Final    Total Bilirubin 02/14/2020 0.9  0.1 - 1.0 mg/dL Final    Comment: For infants and newborns, interpretation of results should be based  on gestational age, weight and in agreement with clinical  observations.  Premature Infant recommended reference ranges:  Up to 24 hours.............<8.0 mg/dL  Up to 48 hours............<12.0 mg/dL  3-5 days..................<15.0 mg/dL  6-29 days.................<15.0 mg/dL      Alkaline Phosphatase 02/14/2020 78  38 - 126 U/L Final    AST 02/14/2020 28  15 - 46 U/L Final    ALT 02/14/2020 22  10 - 44 U/L Final    Anion Gap 02/14/2020 14  8 - 16 mmol/L Final    eGFR if African American 02/14/2020 >60.0  >60 mL/min/1.73 m^2 Final    eGFR if non African American 02/14/2020 >60.0  >60 mL/min/1.73 m^2 Final    Comment: Calculation used to obtain the estimated glomerular filtration  rate (eGFR) is the CKD-EPI equation.       HIV 1/2 Ag/Ab 02/14/2020 Negative  Negative Final       ASSESSMENT/PLAN:  Problem List Items Addressed This Visit        Psychiatric    Attention deficit hyperactivity disorder (ADHD), predominantly inattentive type - Primary    Current Assessment & Plan     -  reviewed  - well controlled  - continue  current medication         Relevant Medications    lisdexamfetamine (VYVANSE) 40 MG Cap    lisdexamfetamine (VYVANSE) 40 MG Cap    lisdexamfetamine (VYVANSE) 40 MG Cap          ORDERS:   Orders Placed This Encounter    lisdexamfetamine (VYVANSE) 40 MG Cap    lisdexamfetamine (VYVANSE) 40 MG Cap    lisdexamfetamine (VYVANSE) 40 MG Cap     Vaccines recommended: up to date and recommend flu vaccine this flu season    Follow-up in 3 months or sooner if any concerns.     Dr. Olga Hui D.O.   Family Medicine

## 2020-08-14 ENCOUNTER — OFFICE VISIT (OUTPATIENT)
Dept: FAMILY MEDICINE | Facility: CLINIC | Age: 20
End: 2020-08-14
Payer: COMMERCIAL

## 2020-08-14 VITALS
TEMPERATURE: 97 F | HEART RATE: 60 BPM | HEIGHT: 67 IN | SYSTOLIC BLOOD PRESSURE: 120 MMHG | WEIGHT: 165.31 LBS | OXYGEN SATURATION: 99 % | RESPIRATION RATE: 18 BRPM | BODY MASS INDEX: 25.94 KG/M2 | DIASTOLIC BLOOD PRESSURE: 76 MMHG

## 2020-08-14 DIAGNOSIS — F90.0 ATTENTION DEFICIT HYPERACTIVITY DISORDER (ADHD), PREDOMINANTLY INATTENTIVE TYPE: Primary | ICD-10-CM

## 2020-08-14 PROCEDURE — 99999 PR PBB SHADOW E&M-EST. PATIENT-LVL IV: CPT | Mod: PBBFAC,,, | Performed by: FAMILY MEDICINE

## 2020-08-14 PROCEDURE — 99214 PR OFFICE/OUTPT VISIT, EST, LEVL IV, 30-39 MIN: ICD-10-PCS | Mod: S$GLB,,, | Performed by: FAMILY MEDICINE

## 2020-08-14 PROCEDURE — 99214 OFFICE O/P EST MOD 30 MIN: CPT | Mod: S$GLB,,, | Performed by: FAMILY MEDICINE

## 2020-08-14 PROCEDURE — 3008F BODY MASS INDEX DOCD: CPT | Mod: CPTII,S$GLB,, | Performed by: FAMILY MEDICINE

## 2020-08-14 PROCEDURE — 3008F PR BODY MASS INDEX (BMI) DOCUMENTED: ICD-10-PCS | Mod: CPTII,S$GLB,, | Performed by: FAMILY MEDICINE

## 2020-08-14 PROCEDURE — 99999 PR PBB SHADOW E&M-EST. PATIENT-LVL IV: ICD-10-PCS | Mod: PBBFAC,,, | Performed by: FAMILY MEDICINE

## 2020-08-14 RX ORDER — LISDEXAMFETAMINE DIMESYLATE 40 MG/1
40 CAPSULE ORAL DAILY
Qty: 30 CAPSULE | Refills: 0 | Status: SHIPPED | OUTPATIENT
Start: 2020-08-14 | End: 2021-02-10

## 2020-08-14 RX ORDER — LISDEXAMFETAMINE DIMESYLATE 40 MG/1
40 CAPSULE ORAL DAILY
Qty: 30 CAPSULE | Refills: 0 | Status: SHIPPED | OUTPATIENT
Start: 2020-08-14 | End: 2021-02-10 | Stop reason: SDUPTHER

## 2020-08-14 RX ORDER — LISDEXAMFETAMINE DIMESYLATE 40 MG/1
40 CAPSULE ORAL EVERY MORNING
Qty: 30 CAPSULE | Refills: 0 | Status: SHIPPED | OUTPATIENT
Start: 2020-08-14 | End: 2020-12-23 | Stop reason: SDUPTHER

## 2020-08-18 ENCOUNTER — PATIENT OUTREACH (OUTPATIENT)
Dept: ADMINISTRATIVE | Facility: HOSPITAL | Age: 20
End: 2020-08-18

## 2020-09-02 NOTE — TELEPHONE ENCOUNTER
----- Message from Patricia Espinal sent at 11/8/2017 10:31 AM CST -----  Contact: Mom----[phone]  Mom----[phone]-----Calling to get the pt lisdexamfetamine (VYVANSE) 30 MG capsule refilled. Please call RX to [facility] [phone] in [facility] . Mom requesting a call when RX is called in.  
Last med check 7/26/17.   
Unavailable. Left message to call office.   
midline abdominal wall incision, CDI

## 2020-12-22 ENCOUNTER — TELEPHONE (OUTPATIENT)
Dept: FAMILY MEDICINE | Facility: CLINIC | Age: 20
End: 2020-12-22

## 2020-12-22 NOTE — TELEPHONE ENCOUNTER
----- Message from Demario Medel sent at 12/22/2020  2:49 PM CST -----  [phone]   refill      Vyante 30 mg  1/day

## 2020-12-23 ENCOUNTER — OFFICE VISIT (OUTPATIENT)
Dept: FAMILY MEDICINE | Facility: CLINIC | Age: 20
End: 2020-12-23
Payer: COMMERCIAL

## 2020-12-23 DIAGNOSIS — F90.0 ATTENTION DEFICIT HYPERACTIVITY DISORDER (ADHD), PREDOMINANTLY INATTENTIVE TYPE: ICD-10-CM

## 2020-12-23 PROCEDURE — 99214 PR OFFICE/OUTPT VISIT, EST, LEVL IV, 30-39 MIN: ICD-10-PCS | Mod: 95,,, | Performed by: FAMILY MEDICINE

## 2020-12-23 PROCEDURE — 99214 OFFICE O/P EST MOD 30 MIN: CPT | Mod: 95,,, | Performed by: FAMILY MEDICINE

## 2020-12-23 RX ORDER — LISDEXAMFETAMINE DIMESYLATE 40 MG/1
40 CAPSULE ORAL EVERY MORNING
Qty: 30 CAPSULE | Refills: 0 | Status: SHIPPED | OUTPATIENT
Start: 2020-12-23 | End: 2021-02-10

## 2020-12-23 RX ORDER — LISDEXAMFETAMINE DIMESYLATE 40 MG/1
40 CAPSULE ORAL DAILY
Qty: 30 CAPSULE | Refills: 0 | Status: CANCELLED | OUTPATIENT
Start: 2020-12-23

## 2020-12-23 NOTE — PROGRESS NOTES
VIRTUAL/TELEMEDICINE VISIT   FAMILY MEDICINE   [facility]     The patient location is: [place]  The chief complaint leading to consultation is:  Follow-up ADHD  Visit type: Virtual visit with synchronous audio and video Doximity  Total time spent: 25 minute  Each patient to whom he or she provides medical services by telemedicine is:  (1) informed of the relationship between the physician and patient and the respective role of any other health care provider with respect to management of the patient; and (2) notified that he or she may decline to receive medical services by telemedicine and may withdraw from such care at any time.    CC:   Chief Complaint   Patient presents with    Follow-up       SUBJECTIVE:  Hiram Martinez   is a 20 y.o. male  - with ADHD presents for follow-up ADHD     Reports doing well since last visit and tolerating medication. Denies any concerns today.     1. ADHD predominantly inattentive    Age diagnosed: middle school 5th grade  Diagnosed by: Dr. Gillespie (peditrician)   Initial evaluation present in chart: no but continued care by initially provider Dr. Gillespie in chart    Past medications: no other medication beside Vyvanse  Reasons for changing past medications: NA    Current medications:   lisdexamfetamine (VYVANSE) 40 MG Cap, Take 1 capsule (40 mg total) by mouth every morning., Disp: 30 capsule, Rfl: 0    : reviewed last filled 10/28/2020    Concerns with medication: none    AM medication: Vyvanse 40 mg daily at 6:30 AM prior to work  Lunch medication: NA  Afternoon medication: NA   Medication holiday: weekends and school holidays    Daily Activity:  for the [place]  - 7AM-5PM and off Fridays      ROS: Review of Systems   Constitutional: Negative.    HENT: Negative.    Eyes: Negative.    Respiratory: Negative.    Cardiovascular: Negative.    Gastrointestinal: Negative.    Endocrine: Negative.    Genitourinary: Negative.    Musculoskeletal: Negative.    Skin:  Negative.    Allergic/Immunologic: Negative.    Neurological: Negative.    Hematological: Negative.    Psychiatric/Behavioral: Negative.        Past Medical History:   Diagnosis Date    ADHD (attention deficit hyperactivity disorder)     Closed left fibular fracture 02/2018       History reviewed. No pertinent surgical history.    Family History   Problem Relation Age of Onset    Hypertension Father     Diabetes Father     Heart disease Father     No Known Problems Mother        Social History     Tobacco Use    Smoking status: Never Smoker    Smokeless tobacco: Never Used   Substance Use Topics    Alcohol use: No     Frequency: Never    Drug use: No       Social History     Social History Narrative    Lives with both parents, Ирина and 1 brother, Mansoor. Graduate [employer] 2019. 1 dog, skipper. Former Wrestler. Denies alcohol, smoking or illicit drugs. Works at [employer] as an        ALLERGIES: Review of patient's allergies indicates:  No Known Allergies    MEDS:   Current Outpatient Medications:     lisdexamfetamine (VYVANSE) 40 MG Cap, Take 1 capsule (40 mg total) by mouth once daily., Disp: 30 capsule, Rfl: 0    lisdexamfetamine (VYVANSE) 40 MG Cap, Take 1 capsule (40 mg total) by mouth once daily., Disp: 30 capsule, Rfl: 0    lisdexamfetamine (VYVANSE) 40 MG Cap, Take 1 capsule (40 mg total) by mouth every morning., Disp: 30 capsule, Rfl: 0    OBJECTIVE:   There were no vitals filed for this visit.  There is no height or weight on file to calculate BMI.    Physical Exam  Pulmonary:      Effort: Pulmonary effort is normal.   Neurological:      Mental Status: He is alert.   Psychiatric:         Mood and Affect: Mood and affect normal.           PERTINENT RESULTS:   No visits with results within 1 Week(s) from this visit.   Latest known visit with results is:   Lab Visit on 02/14/2020   Component Date Value Ref Range Status    Cholesterol 02/14/2020 201* 120 - 199 mg/dL  Final    Comment: The National Cholesterol Education Program (NCEP) has set the  following guidelines (reference ranges) for Cholesterol:  Optimal.....................<200 mg/dL  Borderline High.............200-239 mg/dL  High........................> or = 240 mg/dL      Triglycerides 02/14/2020 95  30 - 150 mg/dL Final    Comment: The National Cholesterol Education Program (NCEP) has set the  following guidelines (reference values) for triglycerides:  Normal......................<150 mg/dL  Borderline High.............150-199 mg/dL  High........................200-499 mg/dL      HDL 02/14/2020 49  40 - 75 mg/dL Final    Comment: The National Cholesterol Education Program (NCEP) has set the  following guidelines (reference values) for HDL Cholesterol:  Low...............<40 mg/dL  Optimal...........>60 mg/dL      LDL Cholesterol 02/14/2020 133.0  63.0 - 159.0 mg/dL Final    Comment: The National Cholesterol Education Program (NCEP) has set the  following guidelines (reference values) for LDL Cholesterol:  Optimal.......................<130 mg/dL  Borderline High...............130-159 mg/dL  High..........................160-189 mg/dL  Very High.....................>190 mg/dL      HDL/Cholesterol Ratio 02/14/2020 24.4  20.0 - 50.0 % Final    Total Cholesterol/HDL Ratio 02/14/2020 4.1  2.0 - 5.0 Final    Non-HDL Cholesterol 02/14/2020 152  mg/dL Final    Comment: Risk category and Non-HDL cholesterol goals:  Coronary heart disease (CHD)or equivalent (10-year risk of CHD >20%):  Non-HDL cholesterol goal     <130 mg/dL  Two or more CHD risk factors and 10-year risk of CHD <= 20%:  Non-HDL cholesterol goal     <160 mg/dL  0 to 1 CHD risk factor:  Non-HDL cholesterol goal     <190 mg/dL      Sodium 02/14/2020 145  136 - 145 mmol/L Final    Potassium 02/14/2020 4.5  3.5 - 5.1 mmol/L Final    Chloride 02/14/2020 104  95 - 110 mmol/L Final    CO2 02/14/2020 27  23 - 29 mmol/L Final    Glucose 02/14/2020 98  70 -  110 mg/dL Final    BUN 02/14/2020 11  2 - 20 mg/dL Final    Creatinine 02/14/2020 1.08  0.50 - 1.40 mg/dL Final    Calcium 02/14/2020 9.8  8.7 - 10.5 mg/dL Final    Total Protein 02/14/2020 7.9  6.0 - 8.4 g/dL Final    Albumin 02/14/2020 5.1  3.5 - 5.2 g/dL Final    Total Bilirubin 02/14/2020 0.9  0.1 - 1.0 mg/dL Final    Comment: For infants and newborns, interpretation of results should be based  on gestational age, weight and in agreement with clinical  observations.  Premature Infant recommended reference ranges:  Up to 24 hours.............<8.0 mg/dL  Up to 48 hours............<12.0 mg/dL  3-5 days..................<15.0 mg/dL  6-29 days.................<15.0 mg/dL      Alkaline Phosphatase 02/14/2020 78  38 - 126 U/L Final    AST 02/14/2020 28  15 - 46 U/L Final    ALT 02/14/2020 22  10 - 44 U/L Final    Anion Gap 02/14/2020 14  8 - 16 mmol/L Final    eGFR if African American 02/14/2020 >60.0  >60 mL/min/1.73 m^2 Final    eGFR if non African American 02/14/2020 >60.0  >60 mL/min/1.73 m^2 Final    Comment: Calculation used to obtain the estimated glomerular filtration  rate (eGFR) is the CKD-EPI equation.       HIV 1/2 Ag/Ab 02/14/2020 Negative  Negative Final       ASSESSMENT/PLAN:  Problem List Items Addressed This Visit        Psychiatric    Attention deficit hyperactivity disorder (ADHD), predominantly inattentive type    Overview     -  reviewed  - well controlled  - continue current medication         Relevant Medications    lisdexamfetamine (VYVANSE) 40 MG Cap          ORDERS:   Orders Placed This Encounter    lisdexamfetamine (VYVANSE) 40 MG Cap     Vaccines recommended:  Up-to-date    Follow-up 3 months or sooner if any concerns.    Dr. Olga Hui D.O.   Family Medicine

## 2020-12-29 ENCOUNTER — OFFICE VISIT (OUTPATIENT)
Dept: URGENT CARE | Facility: CLINIC | Age: 20
End: 2020-12-29
Payer: COMMERCIAL

## 2020-12-29 VITALS
BODY MASS INDEX: 25.9 KG/M2 | OXYGEN SATURATION: 100 % | HEART RATE: 60 BPM | DIASTOLIC BLOOD PRESSURE: 83 MMHG | HEIGHT: 67 IN | WEIGHT: 165 LBS | TEMPERATURE: 97 F | RESPIRATION RATE: 12 BRPM | SYSTOLIC BLOOD PRESSURE: 138 MMHG

## 2020-12-29 DIAGNOSIS — L73.9 FOLLICULITIS: Primary | ICD-10-CM

## 2020-12-29 DIAGNOSIS — L30.9 DERMATITIS: ICD-10-CM

## 2020-12-29 PROCEDURE — 99214 PR OFFICE/OUTPT VISIT, EST, LEVL IV, 30-39 MIN: ICD-10-PCS | Mod: S$GLB,,, | Performed by: NURSE PRACTITIONER

## 2020-12-29 PROCEDURE — 3008F BODY MASS INDEX DOCD: CPT | Mod: CPTII,S$GLB,, | Performed by: NURSE PRACTITIONER

## 2020-12-29 PROCEDURE — 1126F PR PAIN SEVERITY QUANTIFIED, NO PAIN PRESENT: ICD-10-PCS | Mod: S$GLB,,, | Performed by: NURSE PRACTITIONER

## 2020-12-29 PROCEDURE — 99214 OFFICE O/P EST MOD 30 MIN: CPT | Mod: S$GLB,,, | Performed by: NURSE PRACTITIONER

## 2020-12-29 PROCEDURE — 1126F AMNT PAIN NOTED NONE PRSNT: CPT | Mod: S$GLB,,, | Performed by: NURSE PRACTITIONER

## 2020-12-29 PROCEDURE — 3008F PR BODY MASS INDEX (BMI) DOCUMENTED: ICD-10-PCS | Mod: CPTII,S$GLB,, | Performed by: NURSE PRACTITIONER

## 2020-12-29 RX ORDER — METHYLPREDNISOLONE 4 MG/1
4 TABLET ORAL
Qty: 1 PACKAGE | Refills: 0 | Status: SHIPPED | OUTPATIENT
Start: 2020-12-29 | End: 2021-01-04

## 2020-12-29 RX ORDER — SULFAMETHOXAZOLE AND TRIMETHOPRIM 800; 160 MG/1; MG/1
1 TABLET ORAL 2 TIMES DAILY
Qty: 20 TABLET | Refills: 0 | Status: SHIPPED | OUTPATIENT
Start: 2020-12-29 | End: 2021-01-08

## 2020-12-29 NOTE — LETTER
December 29, 2020      [facility]  [address], [address]  [place] [address]  Phone: [phone]  Fax: [phone]       Patient: Hiram Martinez   YOB: 2000  Date of Visit: 12/29/2020    To Whom It May Concern:    Paige Martinez  was at [facility] on 12/29/2020. He may return to work/school on 12/30/2020 with no restrictions. If you have any questions or concerns, or if I can be of further assistance, please do not hesitate to contact me.    Sincerely,      Mis Abdalla, NP

## 2020-12-29 NOTE — PROGRESS NOTES
"Subjective:       Patient ID: Hiram Martinez is a 20 y.o. male.    Vitals:  height is 5' 7" (1.702 m) and weight is 74.8 kg (165 lb). His temporal temperature is 97.2 °F (36.2 °C). His blood pressure is 138/83 and his pulse is 60. His respiration is 12 and oxygen saturation is 100%.     Chief Complaint: Rash    Used a soap his girlfriend gave him for ajay.   Patient also wanted to have a small skin infection to his left forearm.     Rash  This is a new problem. Episode onset: 2 days ago. The problem has been gradually worsening since onset. The affected locations include the abdomen. The rash is characterized by redness, itchiness and swelling. It is unknown if there was an exposure to a precipitant. Pertinent negatives include no anorexia, congestion, cough, diarrhea, eye pain, facial edema, fatigue, fever, joint pain, nail changes, rhinorrhea, shortness of breath, sore throat or vomiting. Past treatments include anti-itch cream. The treatment provided mild relief. There is no history of allergies, asthma or eczema.       Constitution: Negative for chills, fatigue and fever.   HENT: Negative for congestion and sore throat.    Neck: Negative for painful lymph nodes.   Cardiovascular: Negative for chest pain and leg swelling.   Eyes: Negative for eye pain, double vision and blurred vision.   Respiratory: Negative for cough and shortness of breath.    Gastrointestinal: Negative for nausea, vomiting and diarrhea.   Genitourinary: Negative for dysuria, frequency and urgency.   Musculoskeletal: Negative for joint pain, joint swelling, muscle cramps and muscle ache.   Skin: Positive for rash. Negative for color change, pale and erythema.   Allergic/Immunologic: Negative for seasonal allergies.   Neurological: Negative for dizziness, history of vertigo, light-headedness, passing out and headaches.   Hematologic/Lymphatic: Negative for swollen lymph nodes, easy bruising/bleeding and history of blood clots. Does not " bruise/bleed easily.   Psychiatric/Behavioral: Negative for nervous/anxious, sleep disturbance and depression. The patient is not nervous/anxious.        Objective:      Physical Exam   Constitutional: He is oriented to person, place, and time. He appears well-developed.   HENT:   Head: Normocephalic and atraumatic. Head is without abrasion, without contusion and without laceration.   Ears:   Right Ear: External ear normal.   Left Ear: External ear normal.   Nose: Nose normal.   Eyes: Conjunctivae and lids are normal.   Cardiovascular: Normal rate, regular rhythm and normal heart sounds.   Pulmonary/Chest: Effort normal and breath sounds normal. No stridor. No respiratory distress.   Musculoskeletal: Normal range of motion.   Lymphadenopathy:     He has no cervical adenopathy.        Right cervical: No superficial cervical, no deep cervical and no posterior cervical adenopathy present.       Left cervical: No superficial cervical, no deep cervical and no posterior cervical adenopathy present.   Neurological: He is alert and oriented to person, place, and time. He is not disoriented.   Skin: Skin is warm, dry, rash (noted to trunk of body and right forearm) and maculopapular. Capillary refill takes less than 2 seconds. abrasion, burn, bruising, erythema and ecchymosisPsychiatric: His speech is normal and behavior is normal. Judgment and thought content normal.   Nursing note and vitals reviewed.        Assessment:       1. Folliculitis    2. Dermatitis        Plan:         Folliculitis  Comments:      Orders:  -     sulfamethoxazole-trimethoprim 800-160mg (BACTRIM DS) 800-160 mg Tab; Take 1 tablet by mouth 2 (two) times daily. for 10 days  Dispense: 20 tablet; Refill: 0    Dermatitis  -     methylPREDNISolone (MEDROL DOSEPACK) 4 mg tablet; Take 1 tablet (4 mg total) by mouth as needed (Take as directed). Take as directed  Dispense: 1 Package; Refill: 0      Patient Instructions   Please follow up with your Primary  care provider within 2-5 days if your signs and symptoms have not resolved or worsen.     If your condition worsens or fails to improve we recommend that you receive another evaluation at the emergency room immediately or contact your primary medical clinic to discuss your concerns.    You must understand that you have received an Urgent Care treatment only and that you may be released before all of your medical problems are known or treated.   You, the patient, will arrange for follow up care as instructed.       Skin Rash    If your condition worsens or fails to improve we recommend that you receive another evaluation at the ER immediately or contact your PCP to discuss your concerns or return here.     If you develop additional symptoms such as tongue, mouth or throat swelling or any  trouble breathing go immediately to the ER or call 911.     You must understand that you've received an urgent care treatment only and that you may be released before all your medical problems are known or treated. You the patient will arrange for followup care as instructed.     Zyrtec, Claritin, Xyzal or Allegra should be used daily for the next 7-10 days to prevent or suppress the itching. You can take Benedryl 25 mg at bedtime as well.     Steroid Use    If you had a steroid injection in the clinic and you were given an oral steroid today, do not start the oral prescription until tomorrow.     As discussed, steroid use can elevate your blood pressure, elevate your blood sugar, water weight gain, nervous energy, redness to the face and dimpling of the skin at the injection site.     Oral steroids are hard on the stomach and should always be taken with food.      Once starting a steroid pack, you should complete it as directed.  If you abruptly discontinue the steroid, contact your PCP prior to doing this.      Please do not scratch the areas as they can become infected.  If you notice puss, fever, redness, increased warmth or red  streaking from the area, please see your PCP or Urgent care as soon as possible.      You have been given Bactrim for an infection.  Please take all the medication as directed on the bottle.     Bactrim should be taken with food due to stomach upset.     This medication has sun sensitivity, which means it can cause a severe sunburn if you are exposed to the sunlight.  Please avoid sunlight when on this medication.     As with any antibiotics, use probiotics and/or high culture yogurt about 2 hours apart from the antibiotic and about 1 week after the antibiotic to replace the gut brandy lost with antibiotic use.      If you are female and on BCP use additional methods to prevent pregnancy while on antibiotics and for one cycle after.       Folliculitis  Folliculitis is an inflammation of a hair follicle. A hair follicle is the little pocket where a hair grows out of the skin. Bacteria normally live on the skin. But sometimes bacteria can get trapped in a follicle and cause infection. This causes a bumpy rash. The area over the follicles is red and raised. It may itch or be painful. The bumps may have fluid (pus) inside. The pus may leak and then form crusts. Sores can spread to other areas of the body. Once it goes away, folliculitis can come back at any time. Severe cases may cause permanent hair loss and scarring.  Folliculitis can happen anywhere on the body where hair grows. It can be caused by rubbing from tight clothing. Ingrown hairs can cause it. Soaking in a hot tub or swimming pool that has bacteria in the water can cause it. It may also occur if a hair follicle is blocked by a bandage.  Sores often go away in a few days with no treatment. In some cases, medicine may be given. A small piece of skin or pus may be taken to find the type of bacteria causing the infection.  Home care  The healthcare provider may prescribe an antibiotic cream or ointment.  Oral antibiotics may also be prescribed. Or you may be  told to use an over-the-counter antibiotic cream. Follow all instructions when using any of these medicines.  General care:  · Apply warm, moist compresses to the sores for 20 minutes up to 3 times a day. You can make a compress by soaking a cloth in warm water. Squeeze out excess water.  · Dont cut, poke, or squeeze the sores. This can be painful and spread infection.  · Dont scratch the affected area. Scratching can delay healing.  · Dont shave the areas affected by folliculitis.  · If the sores leak fluid, cover the area with a nonstick gauze bandage. Use as little tape as possible. Carefully discard all soiled bandages.  · Dress in loose cotton clothing.  · Change sheets and blankets if they are soiled by pus. Wash all clothes, towels, sheets, and cloth diapers in soap and hot water. Do not share clothes, towels, or sheets with other family members.  · Do not soak the sores in bath water. This can spread infection. Instead, keep the area clean by gently washing sores with soap and warm water.  · Wash your hands or use antibacterial gels often to prevent spreading the bacteria.  Follow-up care  Follow up with your healthcare provider, or as advised.  When to seek medical advice  Call your healthcare provider right away if any of these occur:  · Fever of 100.4°F (38°C) or higher  · Spreading of the rash  · Rash does not get better with treatment  · Redness or swelling that gets worse  · Rash becomes more painful  · Foul-smelling fluid leaking from the skin  · Rash improves, but then comes back   Date Last Reviewed: 11/1/2016  © 3785-5329 [employer]. [address] [place] [address], [place] [address]. All rights reserved. This information is not intended as a substitute for professional medical care. Always follow your healthcare professional's instructions.      Nonspecific Dermatitis  Dermatitis is a skin rash caused by something that touches the skin and makes it irritated and inflamed.  Your skin may  be red, swollen, dry, and may be cracked. Blisters may form and ooze. The rash will itch.  Dermatitis can form on the face and neck, backs of hands, forearms, genitals, and lower legs. Dermatitis is not passed from person to person.  Talk with your health care provider about what may have caused the rash. Common things that cause skin allergies are metal in jewelry, plants like poison ivy or poison oak, and certain skin care products. You will need to avoid the source of your rash in the future to prevent it from coming back. In some cases, the cause of the dermatitis may not be found.  Treatment is done to relieve itching and prevent the rash from coming back. The rash should go away in a few days to a few weeks.  Home care  The health care provider may prescribe medications to relieve swelling and itching. Follow all instructions when using these medications.  · Avoid anything that heats up your skin, such as hot showers or baths, or direct sunlight. This can make itching worse.  · Stay away from whatever you think caused the rash.  · Bathe in warm, not hot, water. Apply a moisturizing lotion after bathing to prevent dry skin.  · Avoid skin irritants such as wool or silk clothing, grease, oils, harsh soaps, and detergents.  · Apply cold compresses to soothe your sores to help relieve your symptoms. Do this for 30 minutes 3 to 4 times a day. You can make a cold compress by soaking a cloth in cold water. Squeeze out excess water. You can add colloidal oatmeal to the water to help reduce itching. For severe itching in a small area, apply an ice pack wrapped in a thin towel. Do this for 20 minutes 3 to 4 times a day.  · You can also help relieve large areas of itching by taking a lukewarm bath with colloidal oatmeal added to the water.  · Use hydrocortisone cream for redness and irritation, unless another medicine was prescribed. You can also use benzocaine anesthetic cream or spray.  · Use oral diphenhydramine to  help reduce itching. This is an antihistamine you can buy at drug and grocery stores. It can make you sleepy, so use lower doses during the daytime. Or you can use loratadine. This is an antihistamine that will not make you sleepy. Dont use diphenhydramine if you have glaucoma or have trouble urinating because of an enlarged prostate.  · Wash your hands or use an antibacterial gel often to prevent the spread of the rash.  Follow-up care  Follow up with your health care provider. Make an appointment with your health care provider if your symptoms do not get better in the next 1 to 2 weeks.  When to seek medical advice  Call your health care provider right away if any of these occur:  · Spreading of the rash to other parts of your body  · Severe swelling of your face, eyelids, mouth, throat or tongue  · Trouble urinating due to swelling in the genital area  · Fever of 100.4°F (38°C) or higher  · Redness or swelling that gets worse  · Pain that gets worse  · Foul-smelling fluid leaking from the skin  · Yellow-brown crusts on the open blisters  · Joint pain   Date Last Reviewed: 7/23/2014  © 9685-5474 [email or web address]. [address] [place] [address], [place] [address]. All rights reserved. This information is not intended as a substitute for professional medical care. Always follow your healthcare professional's instructions.

## 2020-12-29 NOTE — PATIENT INSTRUCTIONS
Please follow up with your Primary care provider within 2-5 days if your signs and symptoms have not resolved or worsen.     If your condition worsens or fails to improve we recommend that you receive another evaluation at the emergency room immediately or contact your primary medical clinic to discuss your concerns.    You must understand that you have received an Urgent Care treatment only and that you may be released before all of your medical problems are known or treated.   You, the patient, will arrange for follow up care as instructed.       Skin Rash    If your condition worsens or fails to improve we recommend that you receive another evaluation at the ER immediately or contact your PCP to discuss your concerns or return here.     If you develop additional symptoms such as tongue, mouth or throat swelling or any  trouble breathing go immediately to the ER or call 911.     You must understand that you've received an urgent care treatment only and that you may be released before all your medical problems are known or treated. You the patient will arrange for followup care as instructed.     Zyrtec, Claritin, Xyzal or Allegra should be used daily for the next 7-10 days to prevent or suppress the itching. You can take Benedryl 25 mg at bedtime as well.     Steroid Use    If you had a steroid injection in the clinic and you were given an oral steroid today, do not start the oral prescription until tomorrow.     As discussed, steroid use can elevate your blood pressure, elevate your blood sugar, water weight gain, nervous energy, redness to the face and dimpling of the skin at the injection site.     Oral steroids are hard on the stomach and should always be taken with food.      Once starting a steroid pack, you should complete it as directed.  If you abruptly discontinue the steroid, contact your PCP prior to doing this.      Please do not scratch the areas as they can become infected.  If you notice puss,  fever, redness, increased warmth or red streaking from the area, please see your PCP or Urgent care as soon as possible.      You have been given Bactrim for an infection.  Please take all the medication as directed on the bottle.     Bactrim should be taken with food due to stomach upset.     This medication has sun sensitivity, which means it can cause a severe sunburn if you are exposed to the sunlight.  Please avoid sunlight when on this medication.     As with any antibiotics, use probiotics and/or high culture yogurt about 2 hours apart from the antibiotic and about 1 week after the antibiotic to replace the gut brandy lost with antibiotic use.      If you are female and on BCP use additional methods to prevent pregnancy while on antibiotics and for one cycle after.       Folliculitis  Folliculitis is an inflammation of a hair follicle. A hair follicle is the little pocket where a hair grows out of the skin. Bacteria normally live on the skin. But sometimes bacteria can get trapped in a follicle and cause infection. This causes a bumpy rash. The area over the follicles is red and raised. It may itch or be painful. The bumps may have fluid (pus) inside. The pus may leak and then form crusts. Sores can spread to other areas of the body. Once it goes away, folliculitis can come back at any time. Severe cases may cause permanent hair loss and scarring.  Folliculitis can happen anywhere on the body where hair grows. It can be caused by rubbing from tight clothing. Ingrown hairs can cause it. Soaking in a hot tub or swimming pool that has bacteria in the water can cause it. It may also occur if a hair follicle is blocked by a bandage.  Sores often go away in a few days with no treatment. In some cases, medicine may be given. A small piece of skin or pus may be taken to find the type of bacteria causing the infection.  Home care  The healthcare provider may prescribe an antibiotic cream or ointment.  Oral antibiotics  may also be prescribed. Or you may be told to use an over-the-counter antibiotic cream. Follow all instructions when using any of these medicines.  General care:  · Apply warm, moist compresses to the sores for 20 minutes up to 3 times a day. You can make a compress by soaking a cloth in warm water. Squeeze out excess water.  · Dont cut, poke, or squeeze the sores. This can be painful and spread infection.  · Dont scratch the affected area. Scratching can delay healing.  · Dont shave the areas affected by folliculitis.  · If the sores leak fluid, cover the area with a nonstick gauze bandage. Use as little tape as possible. Carefully discard all soiled bandages.  · Dress in loose cotton clothing.  · Change sheets and blankets if they are soiled by pus. Wash all clothes, towels, sheets, and cloth diapers in soap and hot water. Do not share clothes, towels, or sheets with other family members.  · Do not soak the sores in bath water. This can spread infection. Instead, keep the area clean by gently washing sores with soap and warm water.  · Wash your hands or use antibacterial gels often to prevent spreading the bacteria.  Follow-up care  Follow up with your healthcare provider, or as advised.  When to seek medical advice  Call your healthcare provider right away if any of these occur:  · Fever of 100.4°F (38°C) or higher  · Spreading of the rash  · Rash does not get better with treatment  · Redness or swelling that gets worse  · Rash becomes more painful  · Foul-smelling fluid leaking from the skin  · Rash improves, but then comes back   Date Last Reviewed: 11/1/2016  © 3762-0637 [employer]. [address] [place] [address], [place] [address]. All rights reserved. This information is not intended as a substitute for professional medical care. Always follow your healthcare professional's instructions.      Nonspecific Dermatitis  Dermatitis is a skin rash caused by something that touches the skin and makes it  irritated and inflamed.  Your skin may be red, swollen, dry, and may be cracked. Blisters may form and ooze. The rash will itch.  Dermatitis can form on the face and neck, backs of hands, forearms, genitals, and lower legs. Dermatitis is not passed from person to person.  Talk with your health care provider about what may have caused the rash. Common things that cause skin allergies are metal in jewelry, plants like poison ivy or poison oak, and certain skin care products. You will need to avoid the source of your rash in the future to prevent it from coming back. In some cases, the cause of the dermatitis may not be found.  Treatment is done to relieve itching and prevent the rash from coming back. The rash should go away in a few days to a few weeks.  Home care  The health care provider may prescribe medications to relieve swelling and itching. Follow all instructions when using these medications.  · Avoid anything that heats up your skin, such as hot showers or baths, or direct sunlight. This can make itching worse.  · Stay away from whatever you think caused the rash.  · Bathe in warm, not hot, water. Apply a moisturizing lotion after bathing to prevent dry skin.  · Avoid skin irritants such as wool or silk clothing, grease, oils, harsh soaps, and detergents.  · Apply cold compresses to soothe your sores to help relieve your symptoms. Do this for 30 minutes 3 to 4 times a day. You can make a cold compress by soaking a cloth in cold water. Squeeze out excess water. You can add colloidal oatmeal to the water to help reduce itching. For severe itching in a small area, apply an ice pack wrapped in a thin towel. Do this for 20 minutes 3 to 4 times a day.  · You can also help relieve large areas of itching by taking a lukewarm bath with colloidal oatmeal added to the water.  · Use hydrocortisone cream for redness and irritation, unless another medicine was prescribed. You can also use benzocaine anesthetic cream or  spray.  · Use oral diphenhydramine to help reduce itching. This is an antihistamine you can buy at drug and grocery stores. It can make you sleepy, so use lower doses during the daytime. Or you can use loratadine. This is an antihistamine that will not make you sleepy. Dont use diphenhydramine if you have glaucoma or have trouble urinating because of an enlarged prostate.  · Wash your hands or use an antibacterial gel often to prevent the spread of the rash.  Follow-up care  Follow up with your health care provider. Make an appointment with your health care provider if your symptoms do not get better in the next 1 to 2 weeks.  When to seek medical advice  Call your health care provider right away if any of these occur:  · Spreading of the rash to other parts of your body  · Severe swelling of your face, eyelids, mouth, throat or tongue  · Trouble urinating due to swelling in the genital area  · Fever of 100.4°F (38°C) or higher  · Redness or swelling that gets worse  · Pain that gets worse  · Foul-smelling fluid leaking from the skin  · Yellow-brown crusts on the open blisters  · Joint pain   Date Last Reviewed: 7/23/2014  © 9173-5853 [employer]. [address] [place] [address], [place] [address]. All rights reserved. This information is not intended as a substitute for professional medical care. Always follow your healthcare professional's instructions.

## 2021-02-10 DIAGNOSIS — F90.0 ATTENTION DEFICIT HYPERACTIVITY DISORDER (ADHD), PREDOMINANTLY INATTENTIVE TYPE: ICD-10-CM

## 2021-02-10 RX ORDER — LISDEXAMFETAMINE DIMESYLATE 40 MG/1
40 CAPSULE ORAL DAILY
Qty: 30 CAPSULE | Refills: 0 | Status: SHIPPED | OUTPATIENT
Start: 2021-02-10 | End: 2021-11-04 | Stop reason: SDUPTHER

## 2021-02-10 RX ORDER — LISDEXAMFETAMINE DIMESYLATE 40 MG/1
40 CAPSULE ORAL DAILY
Qty: 30 CAPSULE | Refills: 0 | Status: SHIPPED | OUTPATIENT
Start: 2021-02-10 | End: 2021-02-10 | Stop reason: SDUPTHER

## 2021-05-04 ENCOUNTER — PATIENT MESSAGE (OUTPATIENT)
Dept: RESEARCH | Facility: HOSPITAL | Age: 21
End: 2021-05-04

## 2021-11-08 ENCOUNTER — OFFICE VISIT (OUTPATIENT)
Dept: FAMILY MEDICINE | Facility: CLINIC | Age: 21
End: 2021-11-08
Payer: COMMERCIAL

## 2021-11-08 DIAGNOSIS — F90.0 ATTENTION DEFICIT HYPERACTIVITY DISORDER (ADHD), PREDOMINANTLY INATTENTIVE TYPE: Primary | ICD-10-CM

## 2021-11-08 PROCEDURE — 99214 PR OFFICE/OUTPT VISIT, EST, LEVL IV, 30-39 MIN: ICD-10-PCS | Mod: 95,,, | Performed by: FAMILY MEDICINE

## 2021-11-08 PROCEDURE — 99214 OFFICE O/P EST MOD 30 MIN: CPT | Mod: 95,,, | Performed by: FAMILY MEDICINE

## 2021-11-08 PROCEDURE — 1160F RVW MEDS BY RX/DR IN RCRD: CPT | Mod: CPTII,95,, | Performed by: FAMILY MEDICINE

## 2021-11-08 PROCEDURE — 1160F PR REVIEW ALL MEDS BY PRESCRIBER/CLIN PHARMACIST DOCUMENTED: ICD-10-PCS | Mod: CPTII,95,, | Performed by: FAMILY MEDICINE

## 2021-11-08 PROCEDURE — 1159F MED LIST DOCD IN RCRD: CPT | Mod: CPTII,95,, | Performed by: FAMILY MEDICINE

## 2021-11-08 PROCEDURE — 1159F PR MEDICATION LIST DOCUMENTED IN MEDICAL RECORD: ICD-10-PCS | Mod: CPTII,95,, | Performed by: FAMILY MEDICINE

## 2021-11-08 RX ORDER — LISDEXAMFETAMINE DIMESYLATE 40 MG/1
40 CAPSULE ORAL DAILY
Qty: 30 CAPSULE | Refills: 0 | Status: SHIPPED | OUTPATIENT
Start: 2021-11-08

## 2024-03-11 ENCOUNTER — PATIENT OUTREACH (OUTPATIENT)
Dept: ADMINISTRATIVE | Facility: HOSPITAL | Age: 24
End: 2024-03-11
Payer: COMMERCIAL

## 2024-08-23 ENCOUNTER — OFFICE VISIT (OUTPATIENT)
Dept: URGENT CARE | Facility: CLINIC | Age: 24
End: 2024-08-23
Payer: COMMERCIAL

## 2024-08-23 VITALS
WEIGHT: 208.69 LBS | RESPIRATION RATE: 18 BRPM | HEART RATE: 71 BPM | OXYGEN SATURATION: 98 % | TEMPERATURE: 98 F | SYSTOLIC BLOOD PRESSURE: 132 MMHG | DIASTOLIC BLOOD PRESSURE: 90 MMHG | BODY MASS INDEX: 32.75 KG/M2 | HEIGHT: 67 IN

## 2024-08-23 DIAGNOSIS — G44.89 OTHER HEADACHE SYNDROME: Primary | ICD-10-CM

## 2024-08-23 DIAGNOSIS — K21.00 GASTROESOPHAGEAL REFLUX DISEASE WITH ESOPHAGITIS WITHOUT HEMORRHAGE: ICD-10-CM

## 2024-08-23 RX ORDER — BUTALBITAL, ACETAMINOPHEN AND CAFFEINE 50; 325; 40 MG/1; MG/1; MG/1
1 TABLET ORAL EVERY 6 HOURS PRN
Qty: 15 TABLET | Refills: 0 | Status: SHIPPED | OUTPATIENT
Start: 2024-08-23

## 2024-08-23 RX ORDER — KETOROLAC TROMETHAMINE 30 MG/ML
30 INJECTION, SOLUTION INTRAMUSCULAR; INTRAVENOUS
Status: COMPLETED | OUTPATIENT
Start: 2024-08-23 | End: 2024-08-23

## 2024-08-23 RX ORDER — FAMOTIDINE 20 MG/1
20 TABLET, FILM COATED ORAL 2 TIMES DAILY
Qty: 60 TABLET | Refills: 2 | Status: SHIPPED | OUTPATIENT
Start: 2024-08-23

## 2024-08-23 RX ORDER — FAMOTIDINE 20 MG/1
40 TABLET, FILM COATED ORAL
Status: COMPLETED | OUTPATIENT
Start: 2024-08-23 | End: 2024-08-23

## 2024-08-23 RX ADMIN — FAMOTIDINE 40 MG: 20 TABLET, FILM COATED ORAL at 10:08

## 2024-08-23 RX ADMIN — KETOROLAC TROMETHAMINE 30 MG: 30 INJECTION, SOLUTION INTRAMUSCULAR; INTRAVENOUS at 10:08

## 2024-08-23 NOTE — PROGRESS NOTES
"Subjective:      Patient ID: Hiram Martinez is a 23 y.o. male.    Vitals:  height is 5' 7" (1.702 m) and weight is 94.7 kg (208 lb 10.7 oz). His oral temperature is 98.1 °F (36.7 °C). His blood pressure is 132/90 (abnormal) and his pulse is 71. His respiration is 18 and oxygen saturation is 98%.     Chief Complaint: Headache    Pt complains of heartburn that started 2-3 weeks ago. He also complains of headache, nausea and diarrhea that has been going on for 2-3 days.  He was tried ibuprofen and ice pack for the headache which she was given only mild relief.  He does have a history of headaches but not very common.  Headache is mostly in the frontal region.  There was no neck pain or back pain.  No photophobia.  No  vomiting. He also complains of having heartburn for the last 3-4 weeks.  States heartburn is worse when he is lying down a bedtime.  He states also when he sitting back in his chair he sometimes has reflux.  No chest pain no shortness a breath.    Headache   This is a new problem. Episode onset: 2-3 days ago. The problem occurs constantly. The problem has been waxing and waning. The pain is located in the Frontal region. The pain does not radiate. The pain quality is similar to prior headaches. The quality of the pain is described as throbbing. The pain is at a severity of 7/10. The pain is moderate. Associated symptoms include nausea. Pertinent negatives include no coughing, fever (unmeasured), sore throat or vomiting. Nothing aggravates the symptoms. He has tried NSAIDs for the symptoms. The treatment provided no relief.       Constitution: Negative for fever (unmeasured).   HENT:  Negative for congestion and sore throat.         Heartburn   Respiratory:  Negative for cough.    Gastrointestinal:  Positive for nausea and diarrhea. Negative for vomiting.   Musculoskeletal:  Positive for muscle ache.   Skin:  Negative for erythema.   Neurological:  Positive for headaches.      Objective:     Physical " Exam   Constitutional: He is oriented to person, place, and time. He appears well-developed. He is cooperative.  Non-toxic appearance. He does not appear ill. No distress.   HENT:   Head: Normocephalic and atraumatic.   Ears:   Right Ear: Hearing, tympanic membrane, external ear and ear canal normal.   Left Ear: Hearing, tympanic membrane, external ear and ear canal normal.   Nose: Nose normal. No mucosal edema, rhinorrhea or nasal deformity. No epistaxis. Right sinus exhibits no maxillary sinus tenderness and no frontal sinus tenderness. Left sinus exhibits no maxillary sinus tenderness and no frontal sinus tenderness.   Mouth/Throat: Uvula is midline, oropharynx is clear and moist and mucous membranes are normal. No trismus in the jaw. Normal dentition. No uvula swelling. No oropharyngeal exudate, posterior oropharyngeal edema or posterior oropharyngeal erythema.   Eyes: Conjunctivae, EOM and lids are normal. Pupils are equal, round, and reactive to light. Right eye exhibits no discharge. Left eye exhibits no discharge. No scleral icterus.   Neck: Trachea normal and phonation normal. Neck supple. No JVD present. No tracheal deviation present. No thyromegaly present. No edema present. No erythema present. No neck rigidity present.   Cardiovascular: Normal rate, regular rhythm, normal heart sounds and normal pulses.   No murmur heard.Exam reveals no gallop and no friction rub.   Pulmonary/Chest: Effort normal and breath sounds normal. No stridor. No respiratory distress. He has no decreased breath sounds. He has no wheezes. He has no rhonchi. He has no rales. He exhibits no tenderness.   Abdominal: Normal appearance. He exhibits no distension. Soft. There is no abdominal tenderness. There is no rebound and no guarding.   Musculoskeletal: Normal range of motion.         General: No deformity. Normal range of motion.   Neurological: no focal deficit. He is alert and oriented to person, place, and time. He displays no  weakness. No sensory deficit. He exhibits normal muscle tone. Coordination and gait normal.      Comments:   Cranial nerves 2-12 are grossly intact   Skin: Skin is warm, dry, intact, not diaphoretic, not pale and no rash. Capillary refill takes less than 2 seconds. No erythema   Psychiatric: His speech is normal and behavior is normal. Judgment and thought content normal.   Nursing note and vitals reviewed.      Assessment:     1. Other headache syndrome    2. Gastroesophageal reflux disease with esophagitis without hemorrhage        Plan:       Other headache syndrome  -     ketorolac injection 30 mg  -     famotidine (PEPCID) 20 MG tablet; Take 1 tablet (20 mg total) by mouth 2 (two) times daily.  Dispense: 60 tablet; Refill: 2    Gastroesophageal reflux disease with esophagitis without hemorrhage  -     famotidine tablet 40 mg  -     butalbital-acetaminophen-caffeine -40 mg (FIORICET, ESGIC) -40 mg per tablet; Take 1 tablet by mouth every 6 (six) hours as needed for Pain or Headaches.  Dispense: 15 tablet; Refill: 0  -     Ambulatory referral/consult to Gastroenterology      Follow up if symptoms worsen or fail to improve, for F/U with PCP or ED. There are no Patient Instructions on file for this visit.

## 2024-08-23 NOTE — LETTER
August 23, 2024      [facility]  [address] [address], [address]  [place] [address]  Phone: [phone]  Fax: [phone]       Patient: Hiram Martinez   YOB: 2000  Date of Visit: 08/23/2024    To Whom It May Concern:    Paige Martinez  was at [facility] on 08/23/2024. The patient may return to work/school on  08/26/2024 with no restrictions. If you have any questions or concerns, or if I can be of further assistance, please do not hesitate to contact me.    Sincerely,    Owen Lynne PA

## 2024-10-11 ENCOUNTER — OFFICE VISIT (OUTPATIENT)
Dept: GASTROENTEROLOGY | Facility: CLINIC | Age: 24
End: 2024-10-11
Payer: COMMERCIAL

## 2024-10-11 VITALS — HEART RATE: 73 BPM | SYSTOLIC BLOOD PRESSURE: 138 MMHG | DIASTOLIC BLOOD PRESSURE: 86 MMHG

## 2024-10-11 DIAGNOSIS — K21.9 GASTROESOPHAGEAL REFLUX DISEASE, UNSPECIFIED WHETHER ESOPHAGITIS PRESENT: ICD-10-CM

## 2024-10-11 DIAGNOSIS — K21.9 GASTROESOPHAGEAL REFLUX DISEASE, UNSPECIFIED WHETHER ESOPHAGITIS PRESENT: Primary | ICD-10-CM

## 2024-10-11 PROCEDURE — 99999 PR PBB SHADOW E&M-EST. PATIENT-LVL III: CPT | Mod: PBBFAC,,, | Performed by: NURSE PRACTITIONER

## 2024-10-11 RX ORDER — OMEPRAZOLE 40 MG/1
40 CAPSULE, DELAYED RELEASE ORAL
Qty: 90 CAPSULE | Refills: 1 | Status: SHIPPED | OUTPATIENT
Start: 2024-10-11

## 2024-10-11 RX ORDER — OMEPRAZOLE 40 MG/1
40 CAPSULE, DELAYED RELEASE ORAL DAILY
Qty: 30 CAPSULE | Refills: 2 | Status: SHIPPED | OUTPATIENT
Start: 2024-10-11 | End: 2024-10-11

## 2024-10-11 NOTE — PROGRESS NOTES
Subjective:       Patient ID: Hiram Martinez is a 23 y.o. male.    Chief Complaint: Gastroesophageal Reflux    22 y/o male referred by urgent care for reflux. Patient reports progressively worsening heartburn for 2 months. Reports almost constant burning sensation in the epigastric region and chest. Has associated nausea without vomiting. Denies globus sensation, dysphagia, or cough. Symptoms exacerbated by eating and lying flat. Taking famotidine 20 mg bid without relief. Does not drink etoh or smoke. Takes ibuprofen occasionally for headache. Has normal BMs daily. No hematochezia or melena.         Past Medical History:   Diagnosis Date    ADHD (attention deficit hyperactivity disorder)     Closed left fibular fracture 02/2018       History reviewed. No pertinent surgical history.    Family History   Problem Relation Name Age of Onset    Hypertension Father Mateo     Diabetes Father Mateo     Heart disease Father Mateo     No Known Problems Mother Rachel        Social History     Socioeconomic History    Marital status: Single    Number of children: 0   Occupational History    Occupation: Student     Employer: [employer]   Tobacco Use    Smoking status: Never     Passive exposure: Never    Smokeless tobacco: Never   Substance and Sexual Activity    Alcohol use: No    Drug use: No    Sexual activity: Never   Social History Narrative    Lives with both parents, Rachel and Mateo and 1 brother, Mansoor. Graduate [employer] 2019. 1 dog, skipper. Former Wrestler. Denies alcohol, smoking or illicit drugs. Works at [employer] as an        Review of Systems   Constitutional:  Negative for appetite change and unexpected weight change.   HENT:  Negative for trouble swallowing.    Respiratory:  Negative for shortness of breath.    Gastrointestinal:  Positive for abdominal pain and nausea. Negative for constipation, diarrhea and vomiting.   Hematological:  Negative for adenopathy. Does not  bruise/bleed easily.   Psychiatric/Behavioral:  Negative for dysphoric mood.          Objective:     Vitals:    10/11/24 0826   BP: 138/86   Pulse: 73          Physical Exam  Constitutional:       General: He is not in acute distress.     Appearance: Normal appearance.   Eyes:      Conjunctiva/sclera: Conjunctivae normal.   Pulmonary:      Effort: Pulmonary effort is normal. No respiratory distress.   Musculoskeletal:         General: Normal range of motion.      Cervical back: Normal range of motion.   Skin:     General: Skin is warm and dry.   Neurological:      Mental Status: He is alert and oriented to person, place, and time.   Psychiatric:         Mood and Affect: Mood normal.         Behavior: Behavior normal.               Assessment:         ICD-10-CM ICD-9-CM   1. Gastroesophageal reflux disease, unspecified whether esophagitis present  K21.9 530.81       Plan:       Gastroesophageal reflux disease, unspecified whether esophagitis present  -     omeprazole (PRILOSEC) 40 MG capsule; Take 1 capsule (40 mg total) by mouth once daily.  Dispense: 30 capsule; Refill: 2  -     Case Request Endoscopy: EGD (ESOPHAGOGASTRODUODENOSCOPY)  - Discussed elimination of dietary triggers (fatty foods, caffeine, chocolate, spicy foods, food with high fat content, carbonated beverages, and peppermint.  - Raise the head of your bed by 6 to 8 inches - You can do this by putting blocks of wood or rubber under 2 legs of the bed or a foam wedge under the mattress.  - Avoid late meals - Lying down with a full stomach can make reflux worse. Try to plan meals for at least 2 to 3 hours before bedtime.  - Avoid tight clothing - Some people feel better if they wear comfortable clothing that does not squeeze the stomach area.       Follow up if symptoms worsen or fail to improve.     Patient's Medications   New Prescriptions    OMEPRAZOLE (PRILOSEC) 40 MG CAPSULE    Take 1 capsule (40 mg total) by mouth once daily.   Previous Medications     BUTALBITAL-ACETAMINOPHEN-CAFFEINE -40 MG (FIORICET, ESGIC) -40 MG PER TABLET    Take 1 tablet by mouth every 6 (six) hours as needed for Pain or Headaches.    FAMOTIDINE (PEPCID) 20 MG TABLET    Take 1 tablet (20 mg total) by mouth 2 (two) times daily.   Modified Medications    No medications on file   Discontinued Medications    LISDEXAMFETAMINE (VYVANSE) 40 MG CAP    Take 1 capsule (40 mg total) by mouth once daily.

## 2024-10-11 NOTE — PATIENT INSTRUCTIONS
- Omeprazole 40 mg every morning on an empty stomach 30-45 minutes before food or any other medications  - Famotidine 20-40 mg nightly as needed for heartburn        EGD Prep Instructions    [facility] [address] [place]  [address]    You are scheduled for an EGD with Dr. Lantigua on 11/01/2024 at [facility].  You will enter through the [facility] entrance and check in at Same Day Surgery.    Nothing to eat or drink after midnight before the procedure.  You MAY brush your teeth.    You MAY take your blood pressure, heart, and seizure medication on the morning of the procedure, with a SIP of water.  Hold ALL other medications until after the procedure.    You must have someone with you to DRIVE YOU HOME since you will be receiving IV sedation for the procedure.    If you are on blood thinners THAT YOU HAVE BEEN INSTRUCTED TO HOLD BY YOUR DOCTOR FOR THIS PROCEDURE, then do NOT take this the morning of your EGD.  Do NOT stop these medications on your own, they must be approved to be held by your doctor.  Your EGD can NOT be done if you are on these medications.  Examples of blood thinners include: Coumadin, Aggrenox, Plavix, Pradaxa, Reapro, Pletal, Xarelto, Ticagrelor, Brilinta, Eliquis, and high dose aspirin (325 mg).  You do not have to stop baby aspirin 81 mg.    You will receive a call the afternoon before your EGD to tell you the time to arrive.  If you have not received a call by the day before your procedure, call the Pre-op Coordinator at [phone].

## 2024-10-14 ENCOUNTER — TELEPHONE (OUTPATIENT)
Dept: GASTROENTEROLOGY | Facility: CLINIC | Age: 24
End: 2024-10-14
Payer: COMMERCIAL

## 2024-10-14 DIAGNOSIS — K21.9 GASTROESOPHAGEAL REFLUX DISEASE, UNSPECIFIED WHETHER ESOPHAGITIS PRESENT: Primary | ICD-10-CM

## 2024-10-14 NOTE — TELEPHONE ENCOUNTER
----- Message from Chip Romano sent at 10/14/2024  3:58 PM CDT -----  Contact: pt    ----- Message -----  From: Pietro Reinoso  Sent: 10/14/2024   3:52 PM CDT  To: Grzegor[facility] Saxena Staff    Type: Requesting to speak with nurse        Who Called: PT  Regarding: cancel 11/1 endo appointment   Would the patient rather a call back or a response via [facility]? Call back is not needed  Best Call Back Number:    Additional Information:  pt will not reschedule.

## 2024-10-14 NOTE — TELEPHONE ENCOUNTER
Spoke with patient via telephone. Canceling EGD due to associated costs. States symptoms have improved with daily PPI. Will schedule UGI instead. Patient agreeable to plan.

## 2024-10-17 ENCOUNTER — TELEPHONE (OUTPATIENT)
Dept: GASTROENTEROLOGY | Facility: CLINIC | Age: 24
End: 2024-10-17
Payer: COMMERCIAL

## 2024-10-17 NOTE — TELEPHONE ENCOUNTER
UGI scheduled for 10/25/2024.    ----- Message from COLLINS Castillo sent at 10/14/2024  4:26 PM CDT -----  Schedule UGI.

## 2024-10-18 ENCOUNTER — TELEPHONE (OUTPATIENT)
Dept: GASTROENTEROLOGY | Facility: CLINIC | Age: 24
End: 2024-10-18
Payer: COMMERCIAL

## 2024-10-18 NOTE — TELEPHONE ENCOUNTER
Contacted patient, he wanted to reschedule his UGI, UGI rescheduled to 11/01/2024.    ----- Message from Naty sent at 10/17/2024  3:49 PM CDT -----  Regarding: Medical Assisstance  Contact: Patient  Type:  Needs Medical Advice    Who Called: Patient   Symptoms (please be specific): n/a   How long has patient had these symptoms:  n/a  Pharmacy name and phone #:  n/a  Would the patient rather a call back or a response via [facility]? Call back   Best Call Back Number:   [phone]  Additional Information: Patient is requesting a call back in reference to [place] Upper GI Patient was scheduled to complete on 10/25/2024 Patient had to cancel appt and would like to further discuss with physician please assist

## 2024-10-25 ENCOUNTER — OFFICE VISIT (OUTPATIENT)
Dept: FAMILY MEDICINE | Facility: CLINIC | Age: 24
End: 2024-10-25
Payer: COMMERCIAL

## 2024-10-25 VITALS
OXYGEN SATURATION: 98 % | SYSTOLIC BLOOD PRESSURE: 120 MMHG | HEIGHT: 67 IN | WEIGHT: 210.75 LBS | HEART RATE: 59 BPM | DIASTOLIC BLOOD PRESSURE: 82 MMHG | BODY MASS INDEX: 33.08 KG/M2

## 2024-10-25 DIAGNOSIS — Z23 NEED FOR VACCINATION: Primary | ICD-10-CM

## 2024-10-25 DIAGNOSIS — K21.9 GASTROESOPHAGEAL REFLUX DISEASE, UNSPECIFIED WHETHER ESOPHAGITIS PRESENT: ICD-10-CM

## 2024-10-25 DIAGNOSIS — G44.219 EPISODIC TENSION-TYPE HEADACHE, NOT INTRACTABLE: ICD-10-CM

## 2024-10-25 PROCEDURE — 99999 PR PBB SHADOW E&M-EST. PATIENT-LVL III: CPT | Mod: PBBFAC,,, | Performed by: STUDENT IN AN ORGANIZED HEALTH CARE EDUCATION/TRAINING PROGRAM

## 2024-10-25 RX ORDER — SUMATRIPTAN SUCCINATE 100 MG/1
100 TABLET ORAL
Qty: 10 TABLET | Refills: 0 | Status: SHIPPED | OUTPATIENT
Start: 2024-10-25 | End: 2024-11-24

## 2024-10-25 NOTE — PROGRESS NOTES
Patient ID: Hiram Martinez is a 23 y.o. male.    Chief Complaint: Establish Care and Migraine (Weekly migraines starting couple months ago)    History of Present Illness    CHIEF COMPLAINT:  Hiram presents today for follow-up of heartburn and migraines.    GASTROINTESTINAL:  He reports experiencing heartburn for a few months, with frequent occurrences throughout the day. He describes a burning sensation in his chest, which he characterizes as feeling heavy. He denies any specific triggers, noting that it can occur even when drinking water. He reports no associated nausea, vomiting, or blood in stool. He is currently taking Prilosec (omeprazole) for symptom management, which has provided some relief, though he still experiences occasional heartburn.    MIGRAINES:  He has had migraines for the past few months, occurring approximately once a week. Associated symptoms include nausea and occasional vomiting. The headache affects the entire head and typically lasts throughout the night. He finds relief using an ice pack and sleep. He currently uses Excedrin for migraine management, noting it is more effective when taken early in the onset. He denies significant light or sound sensitivity during episodes.    MEDICAL HISTORY:  He has a history of ADHD. He previously took Vyvanse during high school on school days only, abstaining during summers and weekends, but has not used the medication for many years.    FAMILY HISTORY:  His father has diabetes. No other significant family medical history disclosed.    SOCIAL HISTORY:  He is a non-smoker who reports occasional alcohol use. He denies any drug use. He works for the [employer], with a schedule of Monday through Thursday.      ROS:  General: -fever, -chills, -fatigue, -weight gain, -weight loss  Eyes: -vision changes, -redness, -discharge  ENT: -ear pain, -nasal congestion, -sore throat  Cardiovascular: -chest pain, -palpitations, -lower extremity edema  Respiratory: -cough,  -shortness of breath  Gastrointestinal: -abdominal pain, -nausea, -vomiting, -diarrhea, -constipation, -blood in stool, +heartburn  Genitourinary: -dysuria, -hematuria, -frequency  Musculoskeletal: -joint pain, -muscle pain  Skin: -rash, -lesion  Neurological: +headache, -dizziness, -numbness, -tingling  Psychiatric: -anxiety, -depression, -sleep difficulty         Physical Exam    General: No acute distress. Well-developed. Well-nourished.  Eyes: EOMI. Sclerae anicteric.  HENT: Normocephalic. Atraumatic. Nares patent. Moist oral mucosa.  Ears: Bilateral TMs clear. Bilateral EACs clear.  Cardiovascular: Regular rate. Regular rhythm. No murmurs. No rubs. No gallops. Normal S1, S2. Normal heart sounds, no murmurs.  Respiratory: Normal respiratory effort. Clear to auscultation bilaterally. No rales. No rhonchi. No wheezing.  Abdomen: Soft. Non-tender. Non-distended. Normoactive bowel sounds.  Musculoskeletal: No  obvious deformity.  Extremities: No lower extremity edema.  Neurological: Alert & oriented x3. No slurred speech. Normal gait.  Psychiatric: Normal mood. Normal affect. Good insight. Good judgment.  Skin: Warm. Dry. No rash.         Assessment & Plan    HEARTBURN:  - Assessed patient's heartburn symptoms and current management with omeprazole.  - Deferred H.  - pylori testing to GI specialist.  - Hiram to continue avoiding known heartburn triggers.  - Hiram to avoid eating immediately before lying down.  - Continued omeprazole as currently prescribed for heartburn management.    HEADACHE:  - Evaluated migraine-like headaches occurring approximately once a week.  - Considered tension-type headache as possible diagnosis based on reported symptoms and relief with temple massage.  - Will prescribe sumatriptan for acute headache management, as Fioricet was ineffective.  - Recommend combination of ibuprofen and acetaminophen as alternative headache treatment option.  - Explained difference between preventive and  acute headache medications.  - Discussed risk of medication overuse headaches if acute treatments are used more than 15 times per month.  - Informed about potential for sumatriptan to cause drowsiness.  - Recommend practicing stress avoidance and mindfulness techniques for headache management.  - Started sumatriptan 10 tablets per month for acute headache treatment.  - Take 1 tablet at onset of headache, may repeat after 2 hours if needed.  - Maximum 2 tablets per day.  - Discontinued Fioricet for headache management.  - Recommend OTC combination of ibuprofen and acetaminophen for headache relief as alternative to sumatriptan.  - Contact the office if sumatriptan is ineffective for headache management.    FLU VACCINATION:  - Administered flu vaccine in office.    FOLLOW UP:  - Follow up in 6 months on a Friday.  - Use patient portal ([facility]) for medication refill requests.       1. Need for vaccination  -     influenza (Flulaval, Fluzone, Fluarix) 45 mcg/0.5 mL IM vaccine (> or = 6 mo) 0.5 mL    2. Gastroesophageal reflux disease, unspecified whether esophagitis present  Assessment & Plan:  On omeprazole 40 mg daily  Management per GI      3. Episodic tension-type headache, not intractable  Assessment & Plan:  Patient presents with episodic tension headaches occurring approximately once per week. Given the frequency and the current impact on daily activities, preventative treatment is not yet indicated.  Plan:  Sumatriptan: Initiate sumatriptan as an abortive treatment to manage acute headache episodes. Instruct the patient to take it at the onset of a headache.  Monitor Frequency and Effectiveness: Advise the patient to monitor the frequency, severity, and response to sumatriptan over the next few weeks.  Lifestyle and Non-Pharmacologic Measures: Encourage regular sleep, hydration, and stress management practices. Recommend gentle exercise and posture improvements to minimize tension triggers.  Follow-up:  Re-evaluate in 6 months to assess the effectiveness of sumatriptan and determine if headache frequency or severity indicates the need for preventive therapy.  This plan allows close monitoring of headache patterns and effectiveness while managing acute symptoms.      Other orders  -     sumatriptan (IMITREX) 100 MG tablet; Take 1 tablet (100 mg total) by mouth every 2 (two) hours as needed for Migraine (2 doses max/24 hour).  Dispense: 10 tablet; Refill: 0              No follow-ups on file.    This note was generated with the assistance of ambient listening technology. Verbal consent was obtained by the patient and accompanying visitor(s) for the recording of patient appointment to facilitate this note. I attest to having reviewed and edited the generated note for accuracy, though some syntax or spelling errors may persist. Please contact the author of this note for any clarification.

## 2024-10-25 NOTE — ASSESSMENT & PLAN NOTE
Patient presents with episodic tension headaches occurring approximately once per week. Given the frequency and the current impact on daily activities, preventative treatment is not yet indicated.  Plan:  Sumatriptan: Initiate sumatriptan as an abortive treatment to manage acute headache episodes. Instruct the patient to take it at the onset of a headache.  Monitor Frequency and Effectiveness: Advise the patient to monitor the frequency, severity, and response to sumatriptan over the next few weeks.  Lifestyle and Non-Pharmacologic Measures: Encourage regular sleep, hydration, and stress management practices. Recommend gentle exercise and posture improvements to minimize tension triggers.  Follow-up: Re-evaluate in 6 months to assess the effectiveness of sumatriptan and determine if headache frequency or severity indicates the need for preventive therapy.  This plan allows close monitoring of headache patterns and effectiveness while managing acute symptoms.

## 2025-05-30 ENCOUNTER — OFFICE VISIT (OUTPATIENT)
Dept: FAMILY MEDICINE | Facility: CLINIC | Age: 25
End: 2025-05-30
Payer: COMMERCIAL

## 2025-05-30 VITALS
HEART RATE: 61 BPM | DIASTOLIC BLOOD PRESSURE: 88 MMHG | BODY MASS INDEX: 35.5 KG/M2 | WEIGHT: 226.19 LBS | HEIGHT: 67 IN | SYSTOLIC BLOOD PRESSURE: 138 MMHG | OXYGEN SATURATION: 98 %

## 2025-05-30 DIAGNOSIS — G43.709 CHRONIC MIGRAINE WITHOUT AURA WITHOUT STATUS MIGRAINOSUS, NOT INTRACTABLE: Primary | ICD-10-CM

## 2025-05-30 DIAGNOSIS — Z00.00 ANNUAL PHYSICAL EXAM: ICD-10-CM

## 2025-05-30 PROCEDURE — 3008F BODY MASS INDEX DOCD: CPT | Mod: CPTII,S$GLB,, | Performed by: STUDENT IN AN ORGANIZED HEALTH CARE EDUCATION/TRAINING PROGRAM

## 2025-05-30 PROCEDURE — 3075F SYST BP GE 130 - 139MM HG: CPT | Mod: CPTII,S$GLB,, | Performed by: STUDENT IN AN ORGANIZED HEALTH CARE EDUCATION/TRAINING PROGRAM

## 2025-05-30 PROCEDURE — 1159F MED LIST DOCD IN RCRD: CPT | Mod: CPTII,S$GLB,, | Performed by: STUDENT IN AN ORGANIZED HEALTH CARE EDUCATION/TRAINING PROGRAM

## 2025-05-30 PROCEDURE — 99999 PR PBB SHADOW E&M-EST. PATIENT-LVL III: CPT | Mod: PBBFAC,,, | Performed by: STUDENT IN AN ORGANIZED HEALTH CARE EDUCATION/TRAINING PROGRAM

## 2025-05-30 PROCEDURE — 99214 OFFICE O/P EST MOD 30 MIN: CPT | Mod: S$GLB,,, | Performed by: STUDENT IN AN ORGANIZED HEALTH CARE EDUCATION/TRAINING PROGRAM

## 2025-05-30 PROCEDURE — 3079F DIAST BP 80-89 MM HG: CPT | Mod: CPTII,S$GLB,, | Performed by: STUDENT IN AN ORGANIZED HEALTH CARE EDUCATION/TRAINING PROGRAM

## 2025-05-30 PROCEDURE — 1160F RVW MEDS BY RX/DR IN RCRD: CPT | Mod: CPTII,S$GLB,, | Performed by: STUDENT IN AN ORGANIZED HEALTH CARE EDUCATION/TRAINING PROGRAM

## 2025-05-30 RX ORDER — UBROGEPANT 100 MG/1
100 TABLET ORAL
Qty: 15 TABLET | Refills: 2 | Status: SHIPPED | OUTPATIENT
Start: 2025-05-30

## 2025-06-30 ENCOUNTER — OFFICE VISIT (OUTPATIENT)
Dept: URGENT CARE | Facility: CLINIC | Age: 25
End: 2025-06-30
Payer: COMMERCIAL

## 2025-06-30 VITALS
DIASTOLIC BLOOD PRESSURE: 80 MMHG | OXYGEN SATURATION: 96 % | WEIGHT: 224.88 LBS | RESPIRATION RATE: 20 BRPM | TEMPERATURE: 99 F | HEART RATE: 122 BPM | HEIGHT: 67 IN | SYSTOLIC BLOOD PRESSURE: 121 MMHG | BODY MASS INDEX: 35.29 KG/M2

## 2025-06-30 DIAGNOSIS — J02.9 SORE THROAT: ICD-10-CM

## 2025-06-30 DIAGNOSIS — H66.93 BILATERAL OTITIS MEDIA, UNSPECIFIED OTITIS MEDIA TYPE: Primary | ICD-10-CM

## 2025-06-30 LAB
CTP QC/QA: YES
SARS-COV+SARS-COV-2 AG RESP QL IA.RAPID: NEGATIVE

## 2025-06-30 PROCEDURE — 99214 OFFICE O/P EST MOD 30 MIN: CPT | Mod: S$GLB,,, | Performed by: FAMILY MEDICINE

## 2025-06-30 PROCEDURE — 87811 SARS-COV-2 COVID19 W/OPTIC: CPT | Mod: QW,S$GLB,, | Performed by: FAMILY MEDICINE

## 2025-06-30 RX ORDER — AMOXICILLIN AND CLAVULANATE POTASSIUM 875; 125 MG/1; MG/1
1 TABLET, FILM COATED ORAL EVERY 12 HOURS
Qty: 20 TABLET | Refills: 0 | Status: SHIPPED | OUTPATIENT
Start: 2025-06-30 | End: 2025-07-10

## 2025-06-30 NOTE — PROGRESS NOTES
"Subjective:      Patient ID: Hiram Martinez is a 24 y.o. male.    Vitals:  height is 5' 7" (1.702 m) and weight is 102 kg (224 lb 13.9 oz). His oral temperature is 98.9 °F (37.2 °C). His blood pressure is 121/80 and his pulse is 122 (abnormal). His respiration is 20 and oxygen saturation is 96%.     Chief Complaint: Sore Throat    Pt c/o cough, sore throat, headache and ear aches for the past three days. Also reports that he feels weak. Went to [facility], tested negative for strep, covid and flu- Sunday morning. They did not give him an rx but he has taken tylenol and benadryl.    Sore Throat       HENT:  Positive for sore throat.       Objective:     Physical Exam   Constitutional: He appears ill. No distress. obesity  HENT:   Head: Normocephalic and atraumatic.   Ears:   Right Ear: Tympanic membrane is erythematous and bulging.   Left Ear: Tympanic membrane is erythematous and bulging.   Nose: Congestion present.   Mouth/Throat: Mucous membranes are moist. Posterior oropharyngeal erythema present.   Neck: Neck supple.   Cardiovascular: Normal rate, regular rhythm, normal heart sounds and normal pulses.   Pulmonary/Chest: Effort normal and breath sounds normal.   Abdominal: Normal appearance. Soft.   Neurological: He is alert.   Nursing note and vitals reviewed.    Results for orders placed or performed in visit on 06/30/25   SARS Coronavirus 2 Antigen, POCT Manual Read    Collection Time: 06/30/25  6:22 PM   Result Value Ref Range    SARS Coronavirus 2 Antigen Negative Negative, Presumptive Negative     Acceptable Yes       Assessment:     1. Bilateral otitis media, unspecified otitis media type    2. Sore throat        Plan:       Bilateral otitis media, unspecified otitis media type  -     amoxicillin-clavulanate 875-125mg (AUGMENTIN) 875-125 mg per tablet; Take 1 tablet by mouth every 12 (twelve) hours. for 10 days  Dispense: 20 tablet; Refill: 0    Sore throat  -     SARS Coronavirus 2 " Antigen, POCT Manual Read  -     (Magic mouthwash) 1:1:1 diphenhydrAMINE(Benadryl) 12.5mg/5ml liq, aluminum & magnesium hydroxide-simethicone (Maalox), LIDOcaine viscous 2%; Swish and spit 10 mLs every 4 (four) hours as needed (sore throat). for mouth sores  Dispense: 120 mL; Refill: 0      OTC analgesia recommended

## 2025-07-01 ENCOUNTER — OFFICE VISIT (OUTPATIENT)
Dept: FAMILY MEDICINE | Facility: CLINIC | Age: 25
End: 2025-07-01
Payer: COMMERCIAL

## 2025-07-01 VITALS
WEIGHT: 226 LBS | BODY MASS INDEX: 35.47 KG/M2 | HEART RATE: 55 BPM | DIASTOLIC BLOOD PRESSURE: 82 MMHG | HEIGHT: 67 IN | SYSTOLIC BLOOD PRESSURE: 132 MMHG | OXYGEN SATURATION: 99 %

## 2025-07-01 DIAGNOSIS — H65.93 BILATERAL OTITIS MEDIA WITH EFFUSION: Primary | ICD-10-CM

## 2025-07-01 DIAGNOSIS — R03.0 ELEVATED BLOOD PRESSURE READING WITHOUT DIAGNOSIS OF HYPERTENSION: ICD-10-CM

## 2025-07-01 PROCEDURE — 3079F DIAST BP 80-89 MM HG: CPT | Mod: CPTII,S$GLB,, | Performed by: STUDENT IN AN ORGANIZED HEALTH CARE EDUCATION/TRAINING PROGRAM

## 2025-07-01 PROCEDURE — 3075F SYST BP GE 130 - 139MM HG: CPT | Mod: CPTII,S$GLB,, | Performed by: STUDENT IN AN ORGANIZED HEALTH CARE EDUCATION/TRAINING PROGRAM

## 2025-07-01 PROCEDURE — 99999 PR PBB SHADOW E&M-EST. PATIENT-LVL III: CPT | Mod: PBBFAC,,, | Performed by: STUDENT IN AN ORGANIZED HEALTH CARE EDUCATION/TRAINING PROGRAM

## 2025-07-01 PROCEDURE — 99214 OFFICE O/P EST MOD 30 MIN: CPT | Mod: S$GLB,,, | Performed by: STUDENT IN AN ORGANIZED HEALTH CARE EDUCATION/TRAINING PROGRAM

## 2025-07-01 PROCEDURE — 1160F RVW MEDS BY RX/DR IN RCRD: CPT | Mod: CPTII,S$GLB,, | Performed by: STUDENT IN AN ORGANIZED HEALTH CARE EDUCATION/TRAINING PROGRAM

## 2025-07-01 PROCEDURE — 1159F MED LIST DOCD IN RCRD: CPT | Mod: CPTII,S$GLB,, | Performed by: STUDENT IN AN ORGANIZED HEALTH CARE EDUCATION/TRAINING PROGRAM

## 2025-07-01 PROCEDURE — 3008F BODY MASS INDEX DOCD: CPT | Mod: CPTII,S$GLB,, | Performed by: STUDENT IN AN ORGANIZED HEALTH CARE EDUCATION/TRAINING PROGRAM

## 2025-07-01 RX ORDER — LORATADINE AND PSEUDOEPHEDRINE SULFATE 5; 120 MG/1; MG/1
1 TABLET, EXTENDED RELEASE ORAL 2 TIMES DAILY
COMMUNITY
Start: 2025-07-01 | End: 2025-07-11

## 2025-07-01 NOTE — PROGRESS NOTES
Patient ID: Hiram Martinez is a 24 y.o. male.    Chief Complaint: Fever and Otitis Media    History of Present Illness    CHIEF COMPLAINT:  Hiram presents today for follow up of ear infection and elevated blood pressure noted at urgent care.    EAR INFECTION:  He reports bilateral ear pressure with clogged sensation but denies hearing loss. He experienced fever of 100.5°F last night and anticipates potential migraine development secondary to ear infection. Initial urgent care visit included negative testing for strep, COVID, and influenza. Second urgent care visit diagnosed bilateral ear infection, and he started Augmentin yesterday.    BLOOD PRESSURE:  He reports blood pressure elevation to 150s with heart rate of 122 at urgent care. He notes his blood pressure is typically stable, with a recent reading of 121/80. He denies consistent hypertension and has had normal blood pressure trends over the past year.      ROS:  General: +fever, -chills, -fatigue, -weight gain, -weight loss  Eyes: -vision changes, -redness, -discharge  ENT: -ear pain, -nasal congestion, -sore throat, +ear pressure  Cardiovascular: -chest pain, -palpitations, -lower extremity edema, +feelings of fast heart rate  Respiratory: -cough, -shortness of breath  Gastrointestinal: -abdominal pain, -nausea, -vomiting, -diarrhea, -constipation, -blood in stool  Genitourinary: -dysuria, -hematuria, -frequency  Musculoskeletal: -joint pain, -muscle pain  Skin: -rash, -lesion  Neurological: -headache, -dizziness, -numbness, -tingling, +migraines  Psychiatric: -anxiety, -depression, -sleep difficulty         Physical Exam    Vitals: Heart rate: 122.  General: No acute distress. Well-developed. Well-nourished.  Eyes: EOMI. Sclerae anicteric.  HENT: Normocephalic. Atraumatic. Nares patent. Moist oral mucosa.  Ears: Bilateral TMs bulging. Fluid behind bilateral TMs. Bilateral EACs clear.  Cardiovascular: Regular rate. Regular rhythm. No murmurs. No rubs. No  gallops. Normal S1, S2.  Respiratory: Normal respiratory effort. Clear to auscultation bilaterally. No rales. No rhonchi. No wheezing.  Abdomen: Soft. Non-tender. Non-distended. Normoactive bowel sounds.  Musculoskeletal: No  obvious deformity.  Extremities: No lower extremity edema.  Neurological: Alert & oriented x3. No slurred speech. Normal gait.  Psychiatric: Normal mood. Normal affect. Good insight. Good judgment.  Skin: Warm. Dry. No rash.         Assessment & Plan    ## BILATERAL EAR INFECTION:  - Assessed patient's ears via otoscopic exam, noting fluid and bulging in both ears.  - Hiram reports feeling of clogged ears and fluid presence in both ears.  - Instructed to complete current course of Augmentin (antibiotic) for the infection.  - Discussed the importance of drainage for ear infection recovery.  - Prescribed Flonase and Claritin D as additional treatments to aid drainage and relieve symptoms.  - Advised to contact the office if symptoms do not improve within 1 week for potential ENT referral.    ## FEVER:  - Hiram reports running a fever, last recorded at 100.5°F.    ## TACHYCARDIA:  - Noted heart rate was elevated at 122, likely indicating discomfort.    ## NASAL CONGESTION:  - Explained the connection between nasal congestion and ear pressure.  - Instructed patient to use Flonase (nasal spray) to help with drainage and congestion.  - Prescribed Claritin-D (antihistamine with pseudoephedrine) to be taken at night for congestion and drainage.    ## BLOOD PRESSURE MONITORING:  - BP elevation likely due to current discomfort, given normal trends over the past year.  - Clarified that BP diagnosis requires consistent elevation over time in multiple settings.  - Hiram to monitor BP at home and bring home BP machine to next nurse visit for comparison with office equipment.    ## FOLLOW-UP:  - Schedule nurse visit to compare home BP readings with office equipment.  - Work excuse note provided for today.        1. Bilateral otitis media with effusion  -     loratadine-pseudoephedrine 5-120 mg (CLARITIN-D 12 HOUR) 5-120 mg per tablet; Take 1 tablet by mouth 2 (two) times daily. for 10 days    2. Elevated blood pressure reading without diagnosis of hypertension              No follow-ups on file.    This note was generated with the assistance of ambient listening technology. Verbal consent was obtained by the patient and accompanying visitor(s) for the recording of patient appointment to facilitate this note. I attest to having reviewed and edited the generated note for accuracy, though some syntax or spelling errors may persist. Please contact the author of this note for any clarification.